# Patient Record
Sex: MALE | Race: BLACK OR AFRICAN AMERICAN | NOT HISPANIC OR LATINO | Employment: OTHER | URBAN - METROPOLITAN AREA
[De-identification: names, ages, dates, MRNs, and addresses within clinical notes are randomized per-mention and may not be internally consistent; named-entity substitution may affect disease eponyms.]

---

## 2020-03-13 ENCOUNTER — HISTORICAL (OUTPATIENT)
Dept: INTERNAL MEDICINE | Facility: CLINIC | Age: 55
End: 2020-03-13

## 2020-03-13 LAB
ABS NEUT (OLG): 12.21 X10(3)/MCL (ref 2.1–9.2)
ALBUMIN SERPL-MCNC: 4.6 GM/DL (ref 3.4–5)
ALBUMIN/GLOB SERPL: 1.3 RATIO (ref 1.1–2)
ALP SERPL-CCNC: 90 UNIT/L (ref 45–117)
ALT SERPL-CCNC: 71 UNIT/L (ref 12–78)
AST SERPL-CCNC: 31 UNIT/L (ref 15–37)
BASOPHILS # BLD AUTO: 0 X10(3)/MCL (ref 0–0.2)
BASOPHILS NFR BLD AUTO: 0 %
BILIRUB SERPL-MCNC: 0.5 MG/DL (ref 0.2–1)
BILIRUBIN DIRECT+TOT PNL SERPL-MCNC: 0.1 MG/DL (ref 0–0.2)
BILIRUBIN DIRECT+TOT PNL SERPL-MCNC: 0.4 MG/DL
BUN SERPL-MCNC: 16 MG/DL (ref 7–18)
CALCIUM SERPL-MCNC: 9.3 MG/DL (ref 8.5–10.1)
CHLORIDE SERPL-SCNC: 105 MMOL/L (ref 98–107)
CHOLEST SERPL-MCNC: 242 MG/DL
CHOLEST/HDLC SERPL: 3.8 {RATIO} (ref 0–5)
CO2 SERPL-SCNC: 28 MMOL/L (ref 21–32)
CREAT SERPL-MCNC: 1.2 MG/DL (ref 0.6–1.3)
EOSINOPHIL # BLD AUTO: 0 X10(3)/MCL (ref 0–0.9)
EOSINOPHIL NFR BLD AUTO: 0 %
ERYTHROCYTE [DISTWIDTH] IN BLOOD BY AUTOMATED COUNT: 12.9 % (ref 11.5–14.5)
EST. AVERAGE GLUCOSE BLD GHB EST-MCNC: 120 MG/DL
GLOBULIN SER-MCNC: 3.6 GM/ML (ref 2.3–3.5)
GLUCOSE SERPL-MCNC: 121 MG/DL (ref 74–106)
HBA1C MFR BLD: 5.8 % (ref 4.2–6.3)
HCT VFR BLD AUTO: 42 % (ref 40–51)
HDLC SERPL-MCNC: 63 MG/DL (ref 40–59)
HGB BLD-MCNC: 14.3 GM/DL (ref 13.5–17.5)
IMM GRANULOCYTES # BLD AUTO: 0.11 10*3/UL
IMM GRANULOCYTES NFR BLD AUTO: 1 %
LDLC SERPL CALC-MCNC: 162 MG/DL
LYMPHOCYTES # BLD AUTO: 2.4 X10(3)/MCL (ref 0.6–4.6)
LYMPHOCYTES NFR BLD AUTO: 16 %
MCH RBC QN AUTO: 29.4 PG (ref 26–34)
MCHC RBC AUTO-ENTMCNC: 34 GM/DL (ref 31–37)
MCV RBC AUTO: 86.4 FL (ref 80–100)
MONOCYTES # BLD AUTO: 0.7 X10(3)/MCL (ref 0.1–1.3)
MONOCYTES NFR BLD AUTO: 5 %
NEUTROPHILS # BLD AUTO: 12.21 X10(3)/MCL (ref 2.1–9.2)
NEUTROPHILS NFR BLD AUTO: 79 %
PLATELET # BLD AUTO: 268 X10(3)/MCL (ref 130–400)
PMV BLD AUTO: 10.2 FL (ref 7.4–10.4)
POTASSIUM SERPL-SCNC: 4.1 MMOL/L (ref 3.5–5.1)
PROT SERPL-MCNC: 8.2 GM/DL (ref 6.4–8.2)
RBC # BLD AUTO: 4.86 X10(6)/MCL (ref 4.5–5.9)
SODIUM SERPL-SCNC: 138 MMOL/L (ref 136–145)
TRIGL SERPL-MCNC: 83 MG/DL
TSH SERPL-ACNC: 0.56 MIU/L (ref 0.36–3.74)
VLDLC SERPL CALC-MCNC: 17 MG/DL
WBC # SPEC AUTO: 15.5 X10(3)/MCL (ref 4.5–11)

## 2020-10-20 ENCOUNTER — HISTORICAL (OUTPATIENT)
Dept: INTERNAL MEDICINE | Facility: CLINIC | Age: 55
End: 2020-10-20

## 2021-01-19 LAB — CRC RECOMMENDATION EXT: NORMAL

## 2021-12-16 ENCOUNTER — HISTORICAL (OUTPATIENT)
Dept: RADIOLOGY | Facility: HOSPITAL | Age: 56
End: 2021-12-16

## 2022-04-11 ENCOUNTER — HISTORICAL (OUTPATIENT)
Dept: ADMINISTRATIVE | Facility: HOSPITAL | Age: 57
End: 2022-04-11
Payer: MEDICAID

## 2022-04-24 VITALS
WEIGHT: 238.31 LBS | BODY MASS INDEX: 32.28 KG/M2 | DIASTOLIC BLOOD PRESSURE: 80 MMHG | SYSTOLIC BLOOD PRESSURE: 138 MMHG | HEIGHT: 72 IN

## 2022-05-09 ENCOUNTER — HOSPITAL ENCOUNTER (EMERGENCY)
Facility: HOSPITAL | Age: 57
Discharge: HOME | End: 2022-05-09
Attending: FAMILY MEDICINE
Payer: MEDICAID

## 2022-05-09 VITALS
BODY MASS INDEX: 26.51 KG/M2 | WEIGHT: 200 LBS | OXYGEN SATURATION: 99 % | DIASTOLIC BLOOD PRESSURE: 72 MMHG | SYSTOLIC BLOOD PRESSURE: 146 MMHG | RESPIRATION RATE: 18 BRPM | HEART RATE: 60 BPM | TEMPERATURE: 98 F | HEIGHT: 73 IN

## 2022-05-09 DIAGNOSIS — I10 PRIMARY HYPERTENSION: Primary | ICD-10-CM

## 2022-05-09 DIAGNOSIS — G44.52 NEW DAILY PERSISTENT HEADACHE: ICD-10-CM

## 2022-05-09 PROCEDURE — 99281 EMR DPT VST MAYX REQ PHY/QHP: CPT

## 2022-05-09 RX ORDER — AMLODIPINE BESYLATE 5 MG/1
5 TABLET ORAL DAILY
Qty: 30 TABLET | Refills: 0 | Status: SHIPPED | OUTPATIENT
Start: 2022-05-09 | End: 2022-05-12 | Stop reason: SDUPTHER

## 2022-05-09 RX ORDER — AMLODIPINE BESYLATE 5 MG/1
5 TABLET ORAL
COMMUNITY
Start: 2021-07-28 | End: 2022-05-09 | Stop reason: SDUPTHER

## 2022-05-09 NOTE — ED PROVIDER NOTES
Encounter Date: 5/9/2022       History     Chief Complaint   Patient presents with    Headache     States been out of his blood pressure meds for 6 days. And has a headache.      56-year-old female here for refill on blood pressure med patient says he is out of blood pressure medications.  Patient has appointment with his primary care doctor next week for follow-up.  Patient denies chest pain shortness of breath.  Patient states his headache has resolved.   Patient takes Norvasc 5 mg        Review of patient's allergies indicates:  No Known Allergies  Past Medical History:   Diagnosis Date    Hypertension      History reviewed. No pertinent surgical history.  History reviewed. No pertinent family history.  Social History     Tobacco Use    Smoking status: Current Every Day Smoker     Packs/day: 0.50     Types: Cigarettes    Smokeless tobacco: Never Used   Substance Use Topics    Alcohol use: Never    Drug use: Never     Review of Systems   Constitutional: Negative for fever.   HENT: Negative for sore throat.    Respiratory: Negative for shortness of breath.    Cardiovascular: Negative for chest pain.   Gastrointestinal: Negative for nausea.   Genitourinary: Negative for dysuria.   Musculoskeletal: Negative for back pain.   Skin: Negative for rash.   Neurological: Positive for headaches. Negative for weakness.   Hematological: Does not bruise/bleed easily.   All other systems reviewed and are negative.      Physical Exam     Initial Vitals [05/09/22 1032]   BP Pulse Resp Temp SpO2   (!) 146/72 60 18 98.1 °F (36.7 °C) 99 %      MAP       --         Physical Exam    Nursing note and vitals reviewed.  Constitutional: He appears well-developed and well-nourished. He is active.   HENT:   Head: Normocephalic and atraumatic.   Nose: Nose normal.   Mouth/Throat: Oropharyngeal exudate present.   Eyes: Conjunctivae, EOM and lids are normal. Pupils are equal, round, and reactive to light.   Neck: Trachea normal and  phonation normal. Neck supple. No thyroid mass present.   Normal range of motion.  Cardiovascular: Normal rate, regular rhythm, normal heart sounds and normal pulses.   Pulmonary/Chest: Breath sounds normal.   Abdominal: Abdomen is soft. Bowel sounds are normal.   Musculoskeletal:         General: Normal range of motion.      Cervical back: Normal range of motion and neck supple.     Neurological: He is alert and oriented to person, place, and time. He has normal strength and normal reflexes.   Skin: Skin is warm and intact.   Psychiatric: He has a normal mood and affect. His speech is normal and behavior is normal. Judgment and thought content normal. Cognition and memory are normal.         ED Course   Procedures  Labs Reviewed - No data to display       Imaging Results    None          Medications - No data to display                       Clinical Impression:   Final diagnoses:  [I10] Primary hypertension (Primary)  [G44.52] New daily persistent headache          ED Disposition Condition    Discharge Stable        ED Prescriptions     Medication Sig Dispense Start Date End Date Auth. Provider    amLODIPine (NORVASC) 5 MG tablet Take 1 tablet (5 mg total) by mouth once daily. 30 tablet 5/9/2022  Mansoor Ye MD        Follow-up Information     Follow up With Specialties Details Why Contact Info    [facility] - Emergency Dept Emergency Medicine  If symptoms worsen [address] [facility] [id number] [phone]           Mansoor Ye MD  05/09/22 8203

## 2022-05-12 ENCOUNTER — LAB VISIT (OUTPATIENT)
Dept: LAB | Facility: HOSPITAL | Age: 57
End: 2022-05-12
Attending: NURSE PRACTITIONER
Payer: MEDICAID

## 2022-05-12 ENCOUNTER — OFFICE VISIT (OUTPATIENT)
Dept: INTERNAL MEDICINE | Facility: CLINIC | Age: 57
End: 2022-05-12
Payer: MEDICAID

## 2022-05-12 VITALS
DIASTOLIC BLOOD PRESSURE: 77 MMHG | HEIGHT: 73 IN | TEMPERATURE: 99 F | BODY MASS INDEX: 30.8 KG/M2 | HEART RATE: 62 BPM | RESPIRATION RATE: 18 BRPM | WEIGHT: 232.38 LBS | SYSTOLIC BLOOD PRESSURE: 136 MMHG

## 2022-05-12 DIAGNOSIS — E78.2 MIXED HYPERLIPIDEMIA: Chronic | ICD-10-CM

## 2022-05-12 DIAGNOSIS — I10 PRIMARY HYPERTENSION: Chronic | ICD-10-CM

## 2022-05-12 DIAGNOSIS — Z72.0 TOBACCO USER: Chronic | ICD-10-CM

## 2022-05-12 DIAGNOSIS — E78.2 MIXED HYPERLIPIDEMIA: Primary | Chronic | ICD-10-CM

## 2022-05-12 DIAGNOSIS — E66.9 CLASS 1 OBESITY WITH SERIOUS COMORBIDITY AND BODY MASS INDEX (BMI) OF 30.0 TO 30.9 IN ADULT, UNSPECIFIED OBESITY TYPE: Chronic | ICD-10-CM

## 2022-05-12 DIAGNOSIS — Z13.9 SCREENING DUE: ICD-10-CM

## 2022-05-12 DIAGNOSIS — I10 PRIMARY HYPERTENSION: ICD-10-CM

## 2022-05-12 PROBLEM — G47.30 SLEEP APNEA: Chronic | Status: ACTIVE | Noted: 2022-05-12

## 2022-05-12 PROBLEM — G47.30 SLEEP APNEA: Status: ACTIVE | Noted: 2022-05-12

## 2022-05-12 LAB
ALBUMIN SERPL-MCNC: 4.5 GM/DL (ref 3.5–5)
ALBUMIN/GLOB SERPL: 1.6 RATIO (ref 1.1–2)
ALP SERPL-CCNC: 63 UNIT/L (ref 40–150)
ALT SERPL-CCNC: 20 UNIT/L (ref 0–55)
APPEARANCE UR: CLEAR
AST SERPL-CCNC: 18 UNIT/L (ref 5–34)
BACTERIA #/AREA URNS AUTO: ABNORMAL /HPF
BASOPHILS # BLD AUTO: 0.04 X10(3)/MCL (ref 0–0.2)
BASOPHILS NFR BLD AUTO: 0.5 %
BILIRUB UR QL STRIP.AUTO: NEGATIVE MG/DL
BILIRUBIN DIRECT+TOT PNL SERPL-MCNC: 0.5 MG/DL
BUN SERPL-MCNC: 10.9 MG/DL (ref 8.4–25.7)
CALCIUM SERPL-MCNC: 10.3 MG/DL (ref 8.4–10.2)
CHLORIDE SERPL-SCNC: 105 MMOL/L (ref 98–107)
CHOLEST SERPL-MCNC: 189 MG/DL
CHOLEST/HDLC SERPL: 5 {RATIO} (ref 0–5)
CO2 SERPL-SCNC: 27 MMOL/L (ref 22–29)
COLOR UR AUTO: ABNORMAL
CREAT SERPL-MCNC: 1.11 MG/DL (ref 0.73–1.18)
CREAT UR-MCNC: 228.3 MG/DL (ref 63–166)
EOSINOPHIL # BLD AUTO: 0.07 X10(3)/MCL (ref 0–0.9)
EOSINOPHIL NFR BLD AUTO: 0.9 %
ERYTHROCYTE [DISTWIDTH] IN BLOOD BY AUTOMATED COUNT: 12.7 % (ref 11.5–17)
EST. AVERAGE GLUCOSE BLD GHB EST-MCNC: 114 MG/DL
GLOBULIN SER-MCNC: 2.9 GM/DL (ref 2.4–3.5)
GLUCOSE SERPL-MCNC: 115 MG/DL (ref 74–100)
GLUCOSE UR QL STRIP.AUTO: NORMAL MG/DL
HBA1C MFR BLD: 5.6 %
HCT VFR BLD AUTO: 43.3 % (ref 42–52)
HCV AB SERPL QL IA: NONREACTIVE
HDLC SERPL-MCNC: 40 MG/DL (ref 35–60)
HGB BLD-MCNC: 15.3 GM/DL (ref 14–18)
HIV 1+2 AB+HIV1 P24 AG SERPL QL IA: NONREACTIVE
HYALINE CASTS #/AREA URNS LPF: ABNORMAL /LPF
IMM GRANULOCYTES # BLD AUTO: 0.03 X10(3)/MCL (ref 0–0.02)
IMM GRANULOCYTES NFR BLD AUTO: 0.4 % (ref 0–0.43)
KETONES UR QL STRIP.AUTO: NEGATIVE MG/DL
LDLC SERPL CALC-MCNC: 113 MG/DL (ref 50–140)
LEUKOCYTE ESTERASE UR QL STRIP.AUTO: NEGATIVE UNIT/L
LYMPHOCYTES # BLD AUTO: 3.03 X10(3)/MCL (ref 0.6–4.6)
LYMPHOCYTES NFR BLD AUTO: 40.8 %
MCH RBC QN AUTO: 30 PG (ref 27–31)
MCHC RBC AUTO-ENTMCNC: 35.3 MG/DL (ref 33–36)
MCV RBC AUTO: 84.9 FL (ref 80–94)
MICROALBUMIN UR-MCNC: 178.8 UG/ML
MICROALBUMIN/CREAT RATIO PNL UR: 78.3 MG/GM CR (ref 0–30)
MONOCYTES # BLD AUTO: 0.49 X10(3)/MCL (ref 0.1–1.3)
MONOCYTES NFR BLD AUTO: 6.6 %
MUCOUS THREADS URNS QL MICRO: ABNORMAL /LPF
NEUTROPHILS # BLD AUTO: 3.8 X10(3)/MCL (ref 2.1–9.2)
NEUTROPHILS NFR BLD AUTO: 50.8 %
NITRITE UR QL STRIP.AUTO: NEGATIVE
NRBC BLD AUTO-RTO: 0 %
PH UR STRIP.AUTO: 6.5 [PH]
PLATELET # BLD AUTO: 263 X10(3)/MCL (ref 130–400)
PMV BLD AUTO: 10.1 FL (ref 9.4–12.4)
POTASSIUM SERPL-SCNC: 4 MMOL/L (ref 3.5–5.1)
PROT SERPL-MCNC: 7.4 GM/DL (ref 6.4–8.3)
PROT UR QL STRIP.AUTO: ABNORMAL MG/DL
PSA SERPL-MCNC: 1.5 NG/ML
RBC # BLD AUTO: 5.1 X10(6)/MCL (ref 4.7–6.1)
RBC #/AREA URNS AUTO: ABNORMAL /HPF
RBC UR QL AUTO: ABNORMAL UNIT/L
SODIUM SERPL-SCNC: 140 MMOL/L (ref 136–145)
SP GR UR STRIP.AUTO: 1.02
SQUAMOUS #/AREA URNS LPF: ABNORMAL /HPF
TRIGL SERPL-MCNC: 180 MG/DL (ref 34–140)
TSH SERPL-ACNC: 1.11 UIU/ML (ref 0.35–4.94)
UROBILINOGEN UR STRIP-ACNC: NORMAL MG/DL
VLDLC SERPL CALC-MCNC: 36 MG/DL
WBC # SPEC AUTO: 7.4 X10(3)/MCL (ref 4.5–11.5)
WBC #/AREA URNS AUTO: ABNORMAL /HPF

## 2022-05-12 PROCEDURE — 81001 URINALYSIS AUTO W/SCOPE: CPT

## 2022-05-12 PROCEDURE — 82043 UR ALBUMIN QUANTITATIVE: CPT

## 2022-05-12 PROCEDURE — 87389 HIV-1 AG W/HIV-1&-2 AB AG IA: CPT

## 2022-05-12 PROCEDURE — 99214 PR OFFICE/OUTPT VISIT, EST, LEVL IV, 30-39 MIN: ICD-10-PCS | Mod: S$PBB,,, | Performed by: NURSE PRACTITIONER

## 2022-05-12 PROCEDURE — 99214 OFFICE O/P EST MOD 30 MIN: CPT | Mod: S$PBB,,, | Performed by: NURSE PRACTITIONER

## 2022-05-12 PROCEDURE — 3008F BODY MASS INDEX DOCD: CPT | Mod: CPTII,,, | Performed by: NURSE PRACTITIONER

## 2022-05-12 PROCEDURE — 85025 COMPLETE CBC W/AUTO DIFF WBC: CPT

## 2022-05-12 PROCEDURE — 86803 HEPATITIS C AB TEST: CPT

## 2022-05-12 PROCEDURE — 80053 COMPREHEN METABOLIC PANEL: CPT

## 2022-05-12 PROCEDURE — 3078F DIAST BP <80 MM HG: CPT | Mod: CPTII,,, | Performed by: NURSE PRACTITIONER

## 2022-05-12 PROCEDURE — 1159F MED LIST DOCD IN RCRD: CPT | Mod: CPTII,,, | Performed by: NURSE PRACTITIONER

## 2022-05-12 PROCEDURE — 84153 ASSAY OF PSA TOTAL: CPT

## 2022-05-12 PROCEDURE — 3075F PR MOST RECENT SYSTOLIC BLOOD PRESS GE 130-139MM HG: ICD-10-PCS | Mod: CPTII,,, | Performed by: NURSE PRACTITIONER

## 2022-05-12 PROCEDURE — 83036 HEMOGLOBIN GLYCOSYLATED A1C: CPT

## 2022-05-12 PROCEDURE — 1160F PR REVIEW ALL MEDS BY PRESCRIBER/CLIN PHARMACIST DOCUMENTED: ICD-10-PCS | Mod: CPTII,,, | Performed by: NURSE PRACTITIONER

## 2022-05-12 PROCEDURE — 3008F PR BODY MASS INDEX (BMI) DOCUMENTED: ICD-10-PCS | Mod: CPTII,,, | Performed by: NURSE PRACTITIONER

## 2022-05-12 PROCEDURE — 3075F SYST BP GE 130 - 139MM HG: CPT | Mod: CPTII,,, | Performed by: NURSE PRACTITIONER

## 2022-05-12 PROCEDURE — 3078F PR MOST RECENT DIASTOLIC BLOOD PRESSURE < 80 MM HG: ICD-10-PCS | Mod: CPTII,,, | Performed by: NURSE PRACTITIONER

## 2022-05-12 PROCEDURE — 99214 OFFICE O/P EST MOD 30 MIN: CPT | Mod: PBBFAC | Performed by: NURSE PRACTITIONER

## 2022-05-12 PROCEDURE — 84443 ASSAY THYROID STIM HORMONE: CPT

## 2022-05-12 PROCEDURE — 84075 ASSAY ALKALINE PHOSPHATASE: CPT

## 2022-05-12 PROCEDURE — 36415 COLL VENOUS BLD VENIPUNCTURE: CPT

## 2022-05-12 PROCEDURE — 1159F PR MEDICATION LIST DOCUMENTED IN MEDICAL RECORD: ICD-10-PCS | Mod: CPTII,,, | Performed by: NURSE PRACTITIONER

## 2022-05-12 PROCEDURE — 1160F RVW MEDS BY RX/DR IN RCRD: CPT | Mod: CPTII,,, | Performed by: NURSE PRACTITIONER

## 2022-05-12 PROCEDURE — 80061 LIPID PANEL: CPT

## 2022-05-12 RX ORDER — AMLODIPINE BESYLATE 5 MG/1
5 TABLET ORAL DAILY
Qty: 90 TABLET | Refills: 2 | Status: SHIPPED | OUTPATIENT
Start: 2022-05-12 | End: 2022-11-02 | Stop reason: SDUPTHER

## 2022-05-12 RX ORDER — OMEPRAZOLE 20 MG/1
20 CAPSULE, DELAYED RELEASE ORAL DAILY
COMMUNITY
Start: 2021-05-21 | End: 2022-05-12 | Stop reason: SDUPTHER

## 2022-05-12 RX ORDER — OMEPRAZOLE 20 MG/1
20 CAPSULE, DELAYED RELEASE ORAL DAILY
Qty: 90 CAPSULE | Refills: 2 | Status: SHIPPED | OUTPATIENT
Start: 2022-05-12 | End: 2022-11-02 | Stop reason: SDUPTHER

## 2022-05-12 RX ORDER — DICLOFENAC SODIUM 75 MG/1
75 TABLET, DELAYED RELEASE ORAL DAILY
COMMUNITY
Start: 2021-12-16 | End: 2022-11-02

## 2022-05-12 RX ORDER — ALLOPURINOL 300 MG/1
300 TABLET ORAL DAILY
Qty: 90 TABLET | Refills: 2 | Status: SHIPPED | OUTPATIENT
Start: 2022-05-12 | End: 2022-11-02 | Stop reason: SDUPTHER

## 2022-05-12 RX ORDER — ALLOPURINOL 300 MG/1
300 TABLET ORAL DAILY
COMMUNITY
Start: 2022-03-30 | End: 2022-05-12 | Stop reason: SDUPTHER

## 2022-05-12 RX ORDER — CYCLOBENZAPRINE HCL 10 MG
10 TABLET ORAL NIGHTLY PRN
COMMUNITY
Start: 2022-01-25 | End: 2022-05-12 | Stop reason: SDUPTHER

## 2022-05-12 RX ORDER — INDOMETHACIN 50 MG/1
50 CAPSULE ORAL 3 TIMES DAILY PRN
Qty: 30 CAPSULE | Refills: 2 | Status: SHIPPED | OUTPATIENT
Start: 2022-05-12 | End: 2022-11-02 | Stop reason: SDUPTHER

## 2022-05-12 RX ORDER — LORATADINE 10 MG/1
10 TABLET ORAL DAILY PRN
Qty: 30 TABLET | Refills: 2 | Status: SHIPPED | OUTPATIENT
Start: 2022-05-12 | End: 2022-11-02 | Stop reason: SDUPTHER

## 2022-05-12 RX ORDER — HYDROCHLOROTHIAZIDE 25 MG/1
25 TABLET ORAL DAILY
COMMUNITY
Start: 2021-07-28 | End: 2022-05-12 | Stop reason: SDUPTHER

## 2022-05-12 RX ORDER — CYCLOBENZAPRINE HCL 10 MG
10 TABLET ORAL NIGHTLY PRN
Qty: 30 TABLET | Refills: 1 | Status: SHIPPED | OUTPATIENT
Start: 2022-05-12 | End: 2022-11-02 | Stop reason: SDUPTHER

## 2022-05-12 RX ORDER — HYDROCHLOROTHIAZIDE 25 MG/1
25 TABLET ORAL DAILY
Qty: 90 TABLET | Refills: 2 | Status: SHIPPED | OUTPATIENT
Start: 2022-05-12 | End: 2022-11-02 | Stop reason: SDUPTHER

## 2022-05-12 NOTE — PROGRESS NOTES
Subjective:       Patient ID: Wilfrido Henriquez is a 56 y.o. male.    Chief Complaint: Annual Exam (Out of blood pressure  medication)    Patient has diagnosis of HTN, HLD, Gout, Sleep Apnea, Tobacco use.  Patient seen in clinic today for medication refills.  Patient last seen in clinic on May 21, 2021 by Dr. Bhatt. Patient denies any acute complaints at this time.     Patient seen in ED on 03/03/2022 for right eye pain. Diagnosed with conjunctivitis. Patient prescribed Maxitrol ophthalmic suspension and discharged home.    Patient seen in ED on 12/16/2021 for neck pain. Cervical X-ray indicated degenerative disc disease and spondylosis. Lumbar X-ray indicated. Minimal hypertrophic facet changes at the lower lumbar spine. Minimal degenerative changes at the SI joints.Patient prescribed Diclofenac and Robaxin and discharged home.     Patient seen in ED on 08/19/2021 for left leg pain. VSS & patient in NAD. Afebrile and non-toxic appearing. Resting comfortably in exam room with no acute complaints. Labs unremarkable, will discharge w/ short course of muscle relaxers. Diagnosis & treatment plan discussed with patient & patient voiced understanding. Informed patient that not ever having a job & a diagnosis of controlled HTN are likely not criteria for disability, but that he could contact Dr. Bhatt if he needs further evaluation for this. ED precautions given. All questions have been answered. Stable for discharge.       FIT: 11/29/2020, positive  Colonoscopy: 2021  Lung Cancer Screening: deferred   PSA: ordered    Review of Systems   Constitutional: Negative.    HENT: Negative.    Eyes: Negative.    Respiratory: Negative.    Cardiovascular: Negative.    Gastrointestinal: Negative.    Endocrine: Negative.    Genitourinary: Negative.    Musculoskeletal: Negative.    Integumentary:  Negative.   Allergic/Immunologic: Negative.    Neurological: Negative.    Hematological: Negative.    Psychiatric/Behavioral: Negative.           Objective:      Physical Exam  Vitals reviewed.   Constitutional:       Appearance: Normal appearance.   HENT:      Head: Normocephalic and atraumatic.      Mouth/Throat:      Mouth: Mucous membranes are moist.      Pharynx: Oropharynx is clear.   Eyes:      Extraocular Movements: Extraocular movements intact.      Conjunctiva/sclera: Conjunctivae normal.      Pupils: Pupils are equal, round, and reactive to light.   Cardiovascular:      Rate and Rhythm: Normal rate and regular rhythm.      Heart sounds: Normal heart sounds.   Pulmonary:      Effort: Pulmonary effort is normal.      Breath sounds: Normal breath sounds.   Abdominal:      General: Bowel sounds are normal.      Palpations: Abdomen is soft.   Musculoskeletal:         General: Normal range of motion.      Cervical back: Normal range of motion.   Skin:     General: Skin is warm and dry.   Neurological:      General: No focal deficit present.      Mental Status: He is alert and oriented to person, place, and time.   Psychiatric:         Mood and Affect: Mood normal.         Behavior: Behavior normal.         Assessment:       Problem List Items Addressed This Visit        Cardiac/Vascular    Primary hypertension (Chronic)     B/P: 136/77  UA Creatinine: ordered  UA Microalbumin: ordered  EK2021  Continue Amlodipine 5 mg daily, HCTZ 25 mg daily  DASH diet  Encouraged home blood pressure monitoring           Relevant Orders    CBC Auto Differential    Comprehensive Metabolic Panel    Microalbumin/Creatinine Ratio, Urine    TSH    Urinalysis, Reflex to Urine Culture Urine, Clean Catch    Mixed hyperlipidemia - Primary (Chronic)     No medications noted  Weight loss encouraged  Low fat/high fiber diet  Increase physical activity  Tobacco cessation encouraged  Labs done , repeat labs ordered today  Chol: 242  HDL: 63  Tri  LDL: 162           Relevant Orders    Lipid Panel       Endocrine    Obesity (Chronic)     BMI 30.66  Weight Loss  Encouraged  Increase physical activity           Relevant Orders    Hemoglobin A1C       Other    Tobacco user (Chronic)     Smoking cessation education provided, encouraged. Patient refused smoking cessation at this time.            Relevant Orders    Ambulatory referral/consult to Smoking Cessation Program      Other Visit Diagnoses     Screening due        Relevant Orders    PSA, Screening    Hepatitis C antibody    HIV 1/2 Ag/Ab (4th Gen)          Plan:    Follow up in 4 months with labs to be done prior to visit.

## 2022-05-12 NOTE — ASSESSMENT & PLAN NOTE
No medications noted  Weight loss encouraged  Low fat/high fiber diet  Increase physical activity  Tobacco cessation encouraged  Labs done , repeat labs ordered today  Chol: 242  HDL: 63  Tri  LDL: 162

## 2022-05-13 DIAGNOSIS — I10 HYPERTENSION, UNSPECIFIED TYPE: ICD-10-CM

## 2022-05-13 DIAGNOSIS — R31.0 GROSS HEMATURIA: Primary | ICD-10-CM

## 2022-05-13 DIAGNOSIS — E78.2 MIXED HYPERLIPIDEMIA: ICD-10-CM

## 2022-05-16 ENCOUNTER — TELEPHONE (OUTPATIENT)
Dept: INTERNAL MEDICINE | Facility: CLINIC | Age: 57
End: 2022-05-16
Payer: MEDICAID

## 2022-05-16 NOTE — TELEPHONE ENCOUNTER
PC-lab result given to pt. As reviewed per Denis NP. Informed CT was ordered-will be contacted by  to set up CT + will have fasting labs ordered prior to NV. Verbalized understanding to info.given.

## 2022-06-22 ENCOUNTER — TELEPHONE (OUTPATIENT)
Dept: SMOKING CESSATION | Facility: CLINIC | Age: 57
End: 2022-06-22
Payer: MEDICAID

## 2022-06-22 NOTE — TELEPHONE ENCOUNTER
Attempted to call patient regarding missed [facility] appointment. No answer.  Unable to leave a message.  Voice mailbox not setup.

## 2022-08-23 ENCOUNTER — TELEPHONE (OUTPATIENT)
Dept: ENDOSCOPY | Facility: HOSPITAL | Age: 57
End: 2022-08-23
Payer: MEDICAID

## 2022-10-10 ENCOUNTER — TELEPHONE (OUTPATIENT)
Dept: INTERNAL MEDICINE | Facility: CLINIC | Age: 57
End: 2022-10-10
Payer: MEDICAID

## 2022-10-23 ENCOUNTER — HOSPITAL ENCOUNTER (EMERGENCY)
Facility: HOSPITAL | Age: 57
Discharge: HOME | End: 2022-10-23
Attending: STUDENT IN AN ORGANIZED HEALTH CARE EDUCATION/TRAINING PROGRAM
Payer: MEDICAID

## 2022-10-23 VITALS
DIASTOLIC BLOOD PRESSURE: 82 MMHG | OXYGEN SATURATION: 99 % | HEIGHT: 73 IN | TEMPERATURE: 99 F | BODY MASS INDEX: 31.91 KG/M2 | WEIGHT: 240.75 LBS | RESPIRATION RATE: 14 BRPM | HEART RATE: 68 BPM | SYSTOLIC BLOOD PRESSURE: 156 MMHG

## 2022-10-23 DIAGNOSIS — R07.89 CHEST PAIN, ATYPICAL: Primary | ICD-10-CM

## 2022-10-23 DIAGNOSIS — R07.9 CHEST PAIN: ICD-10-CM

## 2022-10-23 LAB
ALBUMIN SERPL-MCNC: 4.3 GM/DL (ref 3.5–5)
ALBUMIN/GLOB SERPL: 1.7 RATIO (ref 1.1–2)
ALP SERPL-CCNC: 58 UNIT/L (ref 40–150)
ALT SERPL-CCNC: 21 UNIT/L (ref 0–55)
AST SERPL-CCNC: 19 UNIT/L (ref 5–34)
BASOPHILS # BLD AUTO: 0.07 X10(3)/MCL (ref 0–0.2)
BASOPHILS NFR BLD AUTO: 1.1 %
BILIRUBIN DIRECT+TOT PNL SERPL-MCNC: 0.7 MG/DL
BUN SERPL-MCNC: 12.6 MG/DL (ref 8.4–25.7)
CALCIUM SERPL-MCNC: 9.7 MG/DL (ref 8.4–10.2)
CHLORIDE SERPL-SCNC: 102 MMOL/L (ref 98–107)
CO2 SERPL-SCNC: 26 MMOL/L (ref 22–29)
CREAT SERPL-MCNC: 0.99 MG/DL (ref 0.73–1.18)
EOSINOPHIL # BLD AUTO: 0.09 X10(3)/MCL (ref 0–0.9)
EOSINOPHIL NFR BLD AUTO: 1.4 %
ERYTHROCYTE [DISTWIDTH] IN BLOOD BY AUTOMATED COUNT: 12.6 % (ref 11.5–17)
GFR SERPLBLD CREATININE-BSD FMLA CKD-EPI: >60 MLS/MIN/1.73/M2
GLOBULIN SER-MCNC: 2.6 GM/DL (ref 2.4–3.5)
GLUCOSE SERPL-MCNC: 140 MG/DL (ref 74–100)
HCT VFR BLD AUTO: 42.2 % (ref 42–52)
HGB BLD-MCNC: 14.6 GM/DL (ref 14–18)
IMM GRANULOCYTES # BLD AUTO: 0.02 X10(3)/MCL (ref 0–0.04)
IMM GRANULOCYTES NFR BLD AUTO: 0.3 %
LYMPHOCYTES # BLD AUTO: 2.72 X10(3)/MCL (ref 0.6–4.6)
LYMPHOCYTES NFR BLD AUTO: 41.7 %
MCH RBC QN AUTO: 29.6 PG (ref 27–31)
MCHC RBC AUTO-ENTMCNC: 34.6 MG/DL (ref 33–36)
MCV RBC AUTO: 85.6 FL (ref 80–94)
MONOCYTES # BLD AUTO: 0.52 X10(3)/MCL (ref 0.1–1.3)
MONOCYTES NFR BLD AUTO: 8 %
NEUTROPHILS # BLD AUTO: 3.1 X10(3)/MCL (ref 2.1–9.2)
NEUTROPHILS NFR BLD AUTO: 47.5 %
NRBC BLD AUTO-RTO: 0 %
PLATELET # BLD AUTO: 270 X10(3)/MCL (ref 130–400)
PMV BLD AUTO: 10.1 FL (ref 7.4–10.4)
POTASSIUM SERPL-SCNC: 3.3 MMOL/L (ref 3.5–5.1)
PROT SERPL-MCNC: 6.9 GM/DL (ref 6.4–8.3)
RBC # BLD AUTO: 4.93 X10(6)/MCL (ref 4.7–6.1)
SODIUM SERPL-SCNC: 139 MMOL/L (ref 136–145)
TROPONIN I SERPL-MCNC: <0.01 NG/ML (ref 0–0.04)
WBC # SPEC AUTO: 6.5 X10(3)/MCL (ref 4.5–11.5)

## 2022-10-23 PROCEDURE — 84484 ASSAY OF TROPONIN QUANT: CPT

## 2022-10-23 PROCEDURE — 25000003 PHARM REV CODE 250: Performed by: PHYSICIAN ASSISTANT

## 2022-10-23 PROCEDURE — 99285 EMERGENCY DEPT VISIT HI MDM: CPT | Mod: 25

## 2022-10-23 PROCEDURE — 80053 COMPREHEN METABOLIC PANEL: CPT | Performed by: PHYSICIAN ASSISTANT

## 2022-10-23 PROCEDURE — 36415 COLL VENOUS BLD VENIPUNCTURE: CPT | Performed by: PHYSICIAN ASSISTANT

## 2022-10-23 PROCEDURE — 93005 ELECTROCARDIOGRAM TRACING: CPT

## 2022-10-23 PROCEDURE — 85025 COMPLETE CBC W/AUTO DIFF WBC: CPT | Performed by: PHYSICIAN ASSISTANT

## 2022-10-23 PROCEDURE — 84484 ASSAY OF TROPONIN QUANT: CPT | Performed by: PHYSICIAN ASSISTANT

## 2022-10-23 RX ORDER — ASPIRIN 325 MG
325 TABLET ORAL
Status: COMPLETED | OUTPATIENT
Start: 2022-10-23 | End: 2022-10-23

## 2022-10-23 RX ORDER — POTASSIUM CHLORIDE 20 MEQ/1
20 TABLET, EXTENDED RELEASE ORAL
Status: DISCONTINUED | OUTPATIENT
Start: 2022-10-23 | End: 2022-10-23 | Stop reason: HOSPADM

## 2022-10-23 RX ORDER — MAG HYDROX/ALUMINUM HYD/SIMETH 200-200-20
5 SUSPENSION, ORAL (FINAL DOSE FORM) ORAL
Status: COMPLETED | OUTPATIENT
Start: 2022-10-23 | End: 2022-10-23

## 2022-10-23 RX ADMIN — ALUMINUM HYDROXIDE, MAGNESIUM HYDROXIDE, AND DIMETHICONE 5 ML: 200; 20; 200 SUSPENSION ORAL at 09:10

## 2022-10-23 RX ADMIN — ASPIRIN 325 MG ORAL TABLET 325 MG: 325 PILL ORAL at 09:10

## 2022-10-23 NOTE — ED PROVIDER NOTES
"Encounter Date: 10/23/2022       History     Chief Complaint   Patient presents with    Chest Pain     Substernal chest "burning" pain x 3 days     57-year-old gentleman history of hypertension hyperlipidemia presented emergency department with a 3 day history of intermittent mid substernal chest pain described as burning sensation the episodes lasting approximately an hour no exacerbating or alleviating factors no radiation of pain other pertinent review of systems negative.  A prior history of cardiac comorbidities.        Review of patient's allergies indicates:  No Known Allergies  Past Medical History:   Diagnosis Date    Hypertension      History reviewed. No pertinent surgical history.  Family History   Problem Relation Age of Onset    Hypertension Mother     Hypertension Father     Aneurysm Father      Social History     Tobacco Use    Smoking status: Every Day     Packs/day: 0.50     Years: 40.00     Pack years: 20.00     Types: Cigarettes    Smokeless tobacco: Never   Substance Use Topics    Alcohol use: Never    Drug use: Never     Review of Systems   Constitutional:  Negative for chills and fever.   HENT:  Negative for congestion and sinus pain.    Eyes:  Negative for redness and visual disturbance.   Respiratory:  Negative for cough and shortness of breath.    Cardiovascular:  Positive for chest pain. Negative for leg swelling.   Gastrointestinal:  Negative for abdominal pain, diarrhea and nausea.   Endocrine: Negative for polydipsia and polyuria.   Genitourinary:  Negative for dysuria and flank pain.   Musculoskeletal:  Negative for back pain and neck pain.   Skin:  Negative for rash and wound.   Neurological:  Negative for weakness and numbness.     Physical Exam     Initial Vitals [10/23/22 0855]   BP Pulse Resp Temp SpO2   (!) 159/87 66 16 98.8 °F (37.1 °C) 99 %      MAP       --         Physical Exam    Nursing note and vitals reviewed.  Constitutional: He appears well-developed and well-nourished. "   HENT:   Head: Normocephalic and atraumatic.   Eyes: EOM are normal. Pupils are equal, round, and reactive to light.   Neck: Neck supple.   Normal range of motion.  Cardiovascular:  Normal rate, regular rhythm and normal heart sounds.           No murmur heard.  Pulmonary/Chest: Breath sounds normal. No respiratory distress. He has no wheezes. He has no rhonchi. He has no rales.   Abdominal: Abdomen is soft. Bowel sounds are normal.   Musculoskeletal:      Cervical back: Normal range of motion and neck supple.     Neurological: He is alert and oriented to person, place, and time.   Skin: Skin is warm and dry. Capillary refill takes less than 2 seconds.   Psychiatric: He has a normal mood and affect. His behavior is normal.       ED Course   Procedures  Labs Reviewed   COMPREHENSIVE METABOLIC PANEL - Abnormal; Notable for the following components:       Result Value    Potassium Level 3.3 (*)     Glucose Level 140 (*)     All other components within normal limits   TROPONIN I - Normal   CBC W/ AUTO DIFFERENTIAL    Narrative:     The following orders were created for panel order CBC auto differential.  Procedure                               Abnormality         Status                     ---------                               -----------         ------                     CBC with Differential[[id number]]                            Final result                 Please view results for these tests on the individual orders.   CBC WITH DIFFERENTIAL     EKG Readings: (Independently Interpreted)   EKG a 59 normal sinus rhythm 84 beats per minute normal axis no intraventricular block no ectopy no ST deviation   ECG Results              EKG 12-lead (Chest Pain) Age >30 (In process)  Result time 10/23/22 11:17:18      In process by Interface, Lab In [facility] (10/23/22 11:17:18)                   Narrative:    Test Reason : R07.9,    Vent. Rate : 084 BPM     Atrial Rate : 084 BPM     P-R Int : 158 ms          QRS Dur : 108 ms       QT Int : 368 ms       P-R-T Axes : 079 064 036 degrees     QTc Int : 434 ms    Normal sinus rhythm  Normal ECG  No previous ECGs available    Referred By: [email or web address]   SELF           Confirmed By:                                   Imaging Results              X-Ray Chest 1 View (Final result)  Result time 10/23/22 10:57:54      Final result by Juvencio Whyte MD (10/23/22 10:57:54)                   Impression:      No acute chest disease is identified.      Electronically signed by: Juvencio Whyte  Date:    10/23/2022  Time:    10:57               Narrative:    EXAMINATION:  XR CHEST 1 VIEW    CLINICAL HISTORY:  , Chest pain, unspecified.    COMPARISON:  May 5, 2021    FINDINGS:  No alveolar consolidation, effusion, or pneumothorax is seen.   The thoracic aorta is normal  cardiac silhouette, central pulmonary vessels and mediastinum are normal in size and are grossly unremarkable.   visualized osseous structures are grossly unremarkable.                        ED Interpretation by STEVE Rebolledo (10/23/22 10:20:20, [facility], Emergency Medicine)    Negative                                     Medications   aspirin tablet 325 mg (325 mg Oral Given 10/23/22 0922)   aluminum-magnesium hydroxide-simethicone 200-200-20 mg/5 mL suspension 5 mL (5 mLs Oral Given 10/23/22 0922)           APC / Resident Notes:   I was not physically present during the history, exam or disposition of this patient. I was available at all times for consultation. (Zmora)                   Clinical Impression:   Final diagnoses:  [R07.9] Chest pain  [R07.89] Chest pain, atypical (Primary)        ED Disposition Condition    Discharge Stable          ED Prescriptions    None       Follow-up Information       Follow up With Specialties Details Why Contact Info    [facility] Schedule an appointment as soon as possible for a visit in 1 week  [address]  [address]  [phone]      [facility] Emergency Medicine  If symptoms worsen, As needed [address] [facility] [id number] [phone]             STEVE Rebolledo  10/23/22 1029       Srinivas Shafer MD  10/24/22 6341

## 2022-11-01 PROBLEM — E66.09 CLASS 1 OBESITY DUE TO EXCESS CALORIES WITH SERIOUS COMORBIDITY AND BODY MASS INDEX (BMI) OF 31.0 TO 31.9 IN ADULT: Chronic | Status: ACTIVE | Noted: 2022-05-12

## 2022-11-01 PROBLEM — E66.811 CLASS 1 OBESITY DUE TO EXCESS CALORIES WITH SERIOUS COMORBIDITY AND BODY MASS INDEX (BMI) OF 31.0 TO 31.9 IN ADULT: Chronic | Status: ACTIVE | Noted: 2022-05-12

## 2022-11-01 NOTE — PROGRESS NOTES
Subjective:       Patient ID: Wilfrido Henriquez is a 57 y.o. male.    Chief Complaint: Follow-up (C/O having chest pain  for 2 weeks, state went to ED. C/O  having muscle cramps.), Headache, and Chest Pain    Patient has diagnosis of HTN, HLD, Gout, Sleep Apnea, Tobacco use.  Patient seen in clinic today for medication refills.  Patient last seen in clinic on 05/12/2022. 3+ blood noted in urine on labs done in May. Patient denies dysuria abdominal pain. CT abdomen/pelvis ordered but not completed. Patient states his phone was turned off because he couldn't afford to pay it. Patient also states watery eyes at times. Patient denies change in vision, eye pain. Patient denies any other acute complaints at this time.      Patient seen in ED on 12/16/2021 for neck pain. Cervical X-ray indicated degenerative disc disease and spondylosis. Lumbar X-ray indicated. Minimal hypertrophic facet changes at the lower lumbar spine. Minimal degenerative changes at the SI joints.Patient prescribed Diclofenac and Robaxin and discharged home.         FIT: 11/29/2020, positive  Colonoscopy: 01/19/2021 with Dr. Lombardi, no report noted, will try to obtain  Lung Cancer Screening: ordered  PSA: 1.5  Medicare Wellness: N/A    Review of Systems   Constitutional: Negative.    HENT: Negative.     Eyes: Negative.    Respiratory: Negative.     Cardiovascular: Negative.    Gastrointestinal: Negative.    Endocrine: Negative.    Genitourinary: Negative.    Musculoskeletal: Negative.    Integumentary:  Negative.   Allergic/Immunologic: Negative.    Neurological: Negative.    Hematological: Negative.    Psychiatric/Behavioral: Negative.         Objective:      Physical Exam  Vitals reviewed.   Constitutional:       Appearance: Normal appearance.   HENT:      Head: Normocephalic and atraumatic.      Mouth/Throat:      Mouth: Mucous membranes are moist.      Pharynx: Oropharynx is clear.   Eyes:      Extraocular Movements: Extraocular movements intact.       Conjunctiva/sclera: Conjunctivae normal.      Pupils: Pupils are equal, round, and reactive to light.   Cardiovascular:      Rate and Rhythm: Normal rate and regular rhythm.      Heart sounds: Normal heart sounds.   Pulmonary:      Effort: Pulmonary effort is normal.      Breath sounds: Normal breath sounds.   Abdominal:      General: Bowel sounds are normal.   Musculoskeletal:         General: Normal range of motion.      Cervical back: Normal range of motion.   Skin:     General: Skin is warm and dry.   Neurological:      Mental Status: He is alert and oriented to person, place, and time.   Psychiatric:         Mood and Affect: Mood normal.         Behavior: Behavior normal.       Assessment:       Problem List Items Addressed This Visit          Cardiac/Vascular    Primary hypertension - Primary (Chronic)     B/P: 136/77  UA Creatinine: ordered  UA Microalbumin: ordered  EK2021  Continue Amlodipine 5 mg daily, HCTZ 25 mg daily  DASH diet  Encouraged home blood pressure monitoring         Mixed hyperlipidemia (Chronic)     No medications noted  Weight loss encouraged  Low fat/high fiber diet  Increase physical activity  Tobacco cessation encouraged   Latest Reference Range & Units 22 09:47   Cholesterol <=200 mg/dL 189   HDL 35 - 60 mg/dL 40   LDL Cholesterol External 50.00 - 140.00 mg/dL 113.00   Total Cholesterol/HDL Ratio 0 - 5  5   Triglycerides 34 - 140 mg/dL 180 (H)   Very Low Density Lipoprotein  36   (H): Data is abnormally high            Renal/    Asymptomatic microscopic hematuria     CT abdomen/pelvis not done  Repeat UA ordered         Relevant Orders    Urinalysis, Reflex to Urine Culture Urine, Clean Catch       Endocrine    Class 1 obesity due to excess calories with serious comorbidity and body mass index (BMI) of 32.0 to 32.9 in adult (Chronic)     BMI: 32.09  TSH: 1.10  Vitamin D level: ordered  HgbA1c: 5.6  Weight Loss Encouraged  Increase Physical Activity            Other     Cigarette nicotine dependence without complication (Chronic)     Smoking cessation education provided, encouraged. Patient refused smoking cessation at this time.  CT Lung Cancer screening ordered         Relevant Orders    CT Chest Lung Screening Low Dose    RESOLVED: Tobacco user (Chronic)       Plan:    Patient to follow up in 4 months with labs to be done prior to appointment to reassess hematuria, HTN.

## 2022-11-02 ENCOUNTER — OFFICE VISIT (OUTPATIENT)
Dept: INTERNAL MEDICINE | Facility: CLINIC | Age: 57
End: 2022-11-02
Payer: MEDICAID

## 2022-11-02 VITALS
HEART RATE: 59 BPM | DIASTOLIC BLOOD PRESSURE: 80 MMHG | WEIGHT: 243.19 LBS | BODY MASS INDEX: 32.23 KG/M2 | RESPIRATION RATE: 18 BRPM | SYSTOLIC BLOOD PRESSURE: 131 MMHG | HEIGHT: 73 IN | TEMPERATURE: 98 F

## 2022-11-02 DIAGNOSIS — E78.2 MIXED HYPERLIPIDEMIA: Chronic | ICD-10-CM

## 2022-11-02 DIAGNOSIS — R31.21 ASYMPTOMATIC MICROSCOPIC HEMATURIA: ICD-10-CM

## 2022-11-02 DIAGNOSIS — F17.210 CIGARETTE NICOTINE DEPENDENCE WITHOUT COMPLICATION: ICD-10-CM

## 2022-11-02 DIAGNOSIS — E66.09 CLASS 1 OBESITY DUE TO EXCESS CALORIES WITH SERIOUS COMORBIDITY AND BODY MASS INDEX (BMI) OF 32.0 TO 32.9 IN ADULT: Chronic | ICD-10-CM

## 2022-11-02 DIAGNOSIS — I10 PRIMARY HYPERTENSION: Primary | Chronic | ICD-10-CM

## 2022-11-02 DIAGNOSIS — Z72.0 TOBACCO USER: Chronic | ICD-10-CM

## 2022-11-02 LAB
APPEARANCE UR: CLEAR
BACTERIA #/AREA URNS AUTO: ABNORMAL /HPF
BILIRUB UR QL STRIP.AUTO: NEGATIVE MG/DL
COLOR UR AUTO: COLORLESS
GLUCOSE UR QL STRIP.AUTO: NORMAL MG/DL
HYALINE CASTS #/AREA URNS LPF: ABNORMAL /LPF
KETONES UR QL STRIP.AUTO: NEGATIVE MG/DL
LEUKOCYTE ESTERASE UR QL STRIP.AUTO: NEGATIVE UNIT/L
MUCOUS THREADS URNS QL MICRO: ABNORMAL /LPF
NITRITE UR QL STRIP.AUTO: NEGATIVE
PH UR STRIP.AUTO: 5.5 [PH]
PROT UR QL STRIP.AUTO: NEGATIVE MG/DL
RBC #/AREA URNS AUTO: ABNORMAL /HPF
RBC UR QL AUTO: ABNORMAL UNIT/L
SP GR UR STRIP.AUTO: 1.01
SQUAMOUS #/AREA URNS LPF: ABNORMAL /HPF
UROBILINOGEN UR STRIP-ACNC: NORMAL MG/DL
WBC #/AREA URNS AUTO: ABNORMAL /HPF

## 2022-11-02 PROCEDURE — 3008F BODY MASS INDEX DOCD: CPT | Mod: CPTII,,, | Performed by: NURSE PRACTITIONER

## 2022-11-02 PROCEDURE — 3060F PR POS MICROALBUMINURIA RESULT DOCUMENTED/REVIEW: ICD-10-PCS | Mod: CPTII,,, | Performed by: NURSE PRACTITIONER

## 2022-11-02 PROCEDURE — 3066F PR DOCUMENTATION OF TREATMENT FOR NEPHROPATHY: ICD-10-PCS | Mod: CPTII,,, | Performed by: NURSE PRACTITIONER

## 2022-11-02 PROCEDURE — 3075F PR MOST RECENT SYSTOLIC BLOOD PRESS GE 130-139MM HG: ICD-10-PCS | Mod: CPTII,,, | Performed by: NURSE PRACTITIONER

## 2022-11-02 PROCEDURE — 81001 URINALYSIS AUTO W/SCOPE: CPT | Performed by: NURSE PRACTITIONER

## 2022-11-02 PROCEDURE — 3008F PR BODY MASS INDEX (BMI) DOCUMENTED: ICD-10-PCS | Mod: CPTII,,, | Performed by: NURSE PRACTITIONER

## 2022-11-02 PROCEDURE — 3079F DIAST BP 80-89 MM HG: CPT | Mod: CPTII,,, | Performed by: NURSE PRACTITIONER

## 2022-11-02 PROCEDURE — 1160F RVW MEDS BY RX/DR IN RCRD: CPT | Mod: CPTII,,, | Performed by: NURSE PRACTITIONER

## 2022-11-02 PROCEDURE — 1159F PR MEDICATION LIST DOCUMENTED IN MEDICAL RECORD: ICD-10-PCS | Mod: CPTII,,, | Performed by: NURSE PRACTITIONER

## 2022-11-02 PROCEDURE — 3060F POS MICROALBUMINURIA REV: CPT | Mod: CPTII,,, | Performed by: NURSE PRACTITIONER

## 2022-11-02 PROCEDURE — 1160F PR REVIEW ALL MEDS BY PRESCRIBER/CLIN PHARMACIST DOCUMENTED: ICD-10-PCS | Mod: CPTII,,, | Performed by: NURSE PRACTITIONER

## 2022-11-02 PROCEDURE — 3079F PR MOST RECENT DIASTOLIC BLOOD PRESSURE 80-89 MM HG: ICD-10-PCS | Mod: CPTII,,, | Performed by: NURSE PRACTITIONER

## 2022-11-02 PROCEDURE — 99214 OFFICE O/P EST MOD 30 MIN: CPT | Mod: S$PBB,,, | Performed by: NURSE PRACTITIONER

## 2022-11-02 PROCEDURE — 99214 OFFICE O/P EST MOD 30 MIN: CPT | Mod: PBBFAC | Performed by: NURSE PRACTITIONER

## 2022-11-02 PROCEDURE — 3075F SYST BP GE 130 - 139MM HG: CPT | Mod: CPTII,,, | Performed by: NURSE PRACTITIONER

## 2022-11-02 PROCEDURE — 3066F NEPHROPATHY DOC TX: CPT | Mod: CPTII,,, | Performed by: NURSE PRACTITIONER

## 2022-11-02 PROCEDURE — 99214 PR OFFICE/OUTPT VISIT, EST, LEVL IV, 30-39 MIN: ICD-10-PCS | Mod: S$PBB,,, | Performed by: NURSE PRACTITIONER

## 2022-11-02 PROCEDURE — 1159F MED LIST DOCD IN RCRD: CPT | Mod: CPTII,,, | Performed by: NURSE PRACTITIONER

## 2022-11-02 RX ORDER — INDOMETHACIN 50 MG/1
50 CAPSULE ORAL 3 TIMES DAILY PRN
Qty: 30 CAPSULE | Refills: 2 | Status: SHIPPED | OUTPATIENT
Start: 2022-11-02 | End: 2022-11-27 | Stop reason: SDUPTHER

## 2022-11-02 RX ORDER — AMLODIPINE BESYLATE 5 MG/1
5 TABLET ORAL DAILY
Qty: 90 TABLET | Refills: 1 | Status: SHIPPED | OUTPATIENT
Start: 2022-11-02 | End: 2023-03-15 | Stop reason: SDUPTHER

## 2022-11-02 RX ORDER — CYCLOBENZAPRINE HCL 10 MG
10 TABLET ORAL NIGHTLY PRN
Qty: 30 TABLET | Refills: 1 | Status: SHIPPED | OUTPATIENT
Start: 2022-11-02 | End: 2022-11-27 | Stop reason: SDUPTHER

## 2022-11-02 RX ORDER — ALLOPURINOL 300 MG/1
300 TABLET ORAL DAILY
Qty: 90 TABLET | Refills: 1 | Status: SHIPPED | OUTPATIENT
Start: 2022-11-02 | End: 2023-03-15 | Stop reason: SDUPTHER

## 2022-11-02 RX ORDER — LORATADINE 10 MG/1
10 TABLET ORAL DAILY PRN
Qty: 30 TABLET | Refills: 2 | Status: SHIPPED | OUTPATIENT
Start: 2022-11-02 | End: 2023-03-15

## 2022-11-02 RX ORDER — OMEPRAZOLE 20 MG/1
20 CAPSULE, DELAYED RELEASE ORAL DAILY
Qty: 90 CAPSULE | Refills: 1 | Status: SHIPPED | OUTPATIENT
Start: 2022-11-02 | End: 2023-08-09

## 2022-11-02 RX ORDER — HYDROCHLOROTHIAZIDE 25 MG/1
25 TABLET ORAL DAILY
Qty: 90 TABLET | Refills: 1 | Status: SHIPPED | OUTPATIENT
Start: 2022-11-02 | End: 2023-03-15 | Stop reason: SDUPTHER

## 2022-11-02 NOTE — ASSESSMENT & PLAN NOTE
No medications noted  Weight loss encouraged  Low fat/high fiber diet  Increase physical activity  Tobacco cessation encouraged   Latest Reference Range & Units 05/12/22 09:47   Cholesterol <=200 mg/dL 189   HDL 35 - 60 mg/dL 40   LDL Cholesterol External 50.00 - 140.00 mg/dL 113.00   Total Cholesterol/HDL Ratio 0 - 5  5   Triglycerides 34 - 140 mg/dL 180 (H)   Very Low Density Lipoprotein  36   (H): Data is abnormally high

## 2022-11-02 NOTE — ASSESSMENT & PLAN NOTE
BMI: 32.09  TSH: 1.10  Vitamin D level: ordered  HgbA1c: 5.6  Weight Loss Encouraged  Increase Physical Activity

## 2022-11-02 NOTE — ASSESSMENT & PLAN NOTE
B/P: 136/77  UA Creatinine: ordered  UA Microalbumin: ordered  EK2021  Continue Amlodipine 5 mg daily, HCTZ 25 mg daily  DASH diet  Encouraged home blood pressure monitoring

## 2022-11-02 NOTE — ASSESSMENT & PLAN NOTE
Smoking cessation education provided, encouraged. Patient refused smoking cessation at this time.  CT Lung Cancer screening ordered

## 2022-11-08 ENCOUNTER — HOSPITAL ENCOUNTER (OUTPATIENT)
Dept: RADIOLOGY | Facility: HOSPITAL | Age: 57
Discharge: HOME | End: 2022-11-08
Attending: NURSE PRACTITIONER
Payer: MEDICAID

## 2022-11-08 DIAGNOSIS — F17.210 CIGARETTE NICOTINE DEPENDENCE WITHOUT COMPLICATION: ICD-10-CM

## 2022-11-08 PROCEDURE — 71271 CT THORAX LUNG CANCER SCR C-: CPT | Mod: TC

## 2022-11-27 ENCOUNTER — HOSPITAL ENCOUNTER (EMERGENCY)
Facility: HOSPITAL | Age: 57
Discharge: HOME | End: 2022-11-27
Attending: SPECIALIST
Payer: MEDICAID

## 2022-11-27 VITALS
DIASTOLIC BLOOD PRESSURE: 65 MMHG | HEART RATE: 75 BPM | RESPIRATION RATE: 18 BRPM | OXYGEN SATURATION: 98 % | HEIGHT: 73 IN | WEIGHT: 230 LBS | SYSTOLIC BLOOD PRESSURE: 165 MMHG | TEMPERATURE: 98 F | BODY MASS INDEX: 30.48 KG/M2

## 2022-11-27 DIAGNOSIS — M54.2 NECK PAIN: Primary | ICD-10-CM

## 2022-11-27 DIAGNOSIS — R03.0 ELEVATED BLOOD PRESSURE READING: ICD-10-CM

## 2022-11-27 DIAGNOSIS — R52 PAIN: ICD-10-CM

## 2022-11-27 PROCEDURE — 99284 EMERGENCY DEPT VISIT MOD MDM: CPT

## 2022-11-27 RX ORDER — CYCLOBENZAPRINE HCL 10 MG
10 TABLET ORAL NIGHTLY PRN
Qty: 30 TABLET | Refills: 0 | OUTPATIENT
Start: 2022-11-27 | End: 2022-12-04

## 2022-11-27 RX ORDER — INDOMETHACIN 50 MG/1
50 CAPSULE ORAL 3 TIMES DAILY PRN
Qty: 30 CAPSULE | Refills: 0 | OUTPATIENT
Start: 2022-11-27 | End: 2022-12-04

## 2022-11-27 RX ORDER — KETOROLAC TROMETHAMINE 30 MG/ML
60 INJECTION, SOLUTION INTRAMUSCULAR; INTRAVENOUS
Status: DISCONTINUED | OUTPATIENT
Start: 2022-11-27 | End: 2022-11-27 | Stop reason: HOSPADM

## 2022-11-28 NOTE — ED PROVIDER NOTES
Encounter Date: 11/27/2022       History     Chief Complaint   Patient presents with    Neck Pain     Neck pain since Thursday denies fall or injury      Patient complains of left-sided neck pain over the last 4 days but also notes he has chronic neck pain off and on for years; he reports a history of gout; no injury noted, no focal deficits; pain worsened with movement    The history is provided by the patient.   Review of patient's allergies indicates:  No Known Allergies  Past Medical History:   Diagnosis Date    GERD (gastroesophageal reflux disease)     Hypertension      No past surgical history on file.  Family History   Problem Relation Age of Onset    Hypertension Mother     Hypertension Father     Aneurysm Father      Social History     Tobacco Use    Smoking status: Every Day     Packs/day: 0.50     Years: 40.00     Pack years: 20.00     Types: Cigarettes    Smokeless tobacco: Never   Substance Use Topics    Alcohol use: Never    Drug use: Never     Review of Systems   Constitutional: Negative.    HENT: Negative.     Respiratory: Negative.     Cardiovascular: Negative.    Gastrointestinal: Negative.    Genitourinary: Negative.    Musculoskeletal:  Positive for arthralgias.   Neurological: Negative.      Physical Exam     Initial Vitals [11/27/22 1646]   BP Pulse Resp Temp SpO2   (!) 165/65 75 18 98.2 °F (36.8 °C) 98 %      MAP       --         Physical Exam    Nursing note and vitals reviewed.  Constitutional: He appears well-developed and well-nourished.   HENT:   Head: Normocephalic and atraumatic.   Eyes: EOM are normal. Pupils are equal, round, and reactive to light.   Neck: Neck supple.   Left paraspinous tenderness, no spinous tenderness ; full range of motion   Normal range of motion.  Cardiovascular:  Normal rate, regular rhythm and normal heart sounds.           Pulmonary/Chest: Breath sounds normal.   Abdominal: Abdomen is soft.   Musculoskeletal:         General: Normal range of motion.       Cervical back: Normal range of motion and neck supple.     Lymphadenopathy:     He has no cervical adenopathy.   Neurological: He is alert and oriented to person, place, and time.   Skin: Skin is warm and dry.       ED Course   Procedures  Labs Reviewed - No data to display       Imaging Results              X-Ray Cervical Spine AP And Lateral (Preliminary result)  Result time 11/27/22 18:41:51      ED Interpretation by Oj Garza MD (11/27/22 18:41:51, [facility], Emergency Medicine)    Loss of normal curvature, no acute change                                     Medications - No data to display                           Clinical Impression:   Final diagnoses:  [R52] Pain  [M54.2] Neck pain (Primary)  [R03.0] Elevated blood pressure reading        ED Disposition Condition    Discharge Stable          ED Prescriptions       Medication Sig Dispense Start Date End Date Auth. Provider    cyclobenzaprine (FLEXERIL) 10 MG tablet Take 1 tablet (10 mg total) by mouth nightly as needed for Muscle spasms. 30 tablet 11/27/2022 5/26/2023 Oj Garza MD    indomethacin (INDOCIN) 50 MG capsule Take 1 capsule (50 mg total) by mouth 3 (three) times daily as needed (gout pain). 30 capsule 11/27/2022 5/26/2023 Oj Garza MD          Follow-up Information       Follow up With Specialties Details Why Contact Info    [facility] In 1 week As needed [address] [facility] [address] [phone]               Oj Garza MD  11/27/22 3321

## 2022-12-04 ENCOUNTER — HOSPITAL ENCOUNTER (EMERGENCY)
Facility: HOSPITAL | Age: 57
Discharge: HOME | End: 2022-12-04
Attending: EMERGENCY MEDICINE
Payer: MEDICAID

## 2022-12-04 VITALS
DIASTOLIC BLOOD PRESSURE: 89 MMHG | SYSTOLIC BLOOD PRESSURE: 153 MMHG | WEIGHT: 246.94 LBS | OXYGEN SATURATION: 99 % | TEMPERATURE: 99 F | HEART RATE: 78 BPM | BODY MASS INDEX: 32.73 KG/M2 | RESPIRATION RATE: 18 BRPM | HEIGHT: 73 IN

## 2022-12-04 DIAGNOSIS — M54.2 CHRONIC NECK PAIN: Primary | ICD-10-CM

## 2022-12-04 DIAGNOSIS — G89.29 CHRONIC NECK PAIN: Primary | ICD-10-CM

## 2022-12-04 PROCEDURE — 99282 EMERGENCY DEPT VISIT SF MDM: CPT

## 2022-12-04 RX ORDER — TIZANIDINE 4 MG/1
8 TABLET ORAL EVERY 6 HOURS PRN
Qty: 30 TABLET | Refills: 0 | Status: SHIPPED | OUTPATIENT
Start: 2022-12-04 | End: 2022-12-14

## 2022-12-04 RX ORDER — DICLOFENAC SODIUM 75 MG/1
75 TABLET, DELAYED RELEASE ORAL 2 TIMES DAILY PRN
Qty: 20 TABLET | Refills: 0 | OUTPATIENT
Start: 2022-12-04 | End: 2023-02-17

## 2022-12-04 NOTE — ED PROVIDER NOTES
Encounter Date: 12/4/2022       History     Chief Complaint   Patient presents with    Neck Pain     State sudden onset neck pain since thanks giving , denies any trauma or past injury , states seen [facility] unable to find cause still hurting and cant sleep     The patient presents with neck pain.  The onset was chronic increased for 2 weeks.  The course/duration of symptoms is constant.  Location: neck. Type of injury: none and none known.  Radiating pain none.  The character of symptoms is pain.  The degree at present is moderate.  There are exacerbating factors including movement and turning head.  There are relieving factors including rest and immobilization.  Prior episodes: chronic.  Therapy today: none.  Associated symptoms: none.  Additional history: seen at [facility] ER on 11/27 with neg xray.        Review of patient's allergies indicates:  No Known Allergies  Past Medical History:   Diagnosis Date    GERD (gastroesophageal reflux disease)     Hypertension      History reviewed. No pertinent surgical history.  Family History   Problem Relation Age of Onset    Hypertension Mother     Hypertension Father     Aneurysm Father      Social History     Tobacco Use    Smoking status: Every Day     Packs/day: 0.50     Years: 40.00     Pack years: 20.00     Types: Cigarettes    Smokeless tobacco: Never   Substance Use Topics    Alcohol use: Never    Drug use: Never     Review of Systems   Constitutional:  Negative for fever.   HENT:  Negative for sore throat.    Respiratory:  Negative for shortness of breath.    Cardiovascular:  Negative for chest pain.   Gastrointestinal:  Negative for nausea and rectal pain.   Genitourinary:  Negative for dysuria.   Musculoskeletal:  Positive for neck pain. Negative for back pain.   Skin:  Negative for rash.   Neurological:  Negative for weakness.   Hematological:  Does not bruise/bleed easily.   All other systems reviewed and are negative.    Physical Exam     Initial  Vitals [12/04/22 1111]   BP Pulse Resp Temp SpO2   (!) 153/89 78 18 98.6 °F (37 °C) 99 %      MAP       --         Physical Exam    Nursing note and vitals reviewed.  Constitutional: He appears well-developed and well-nourished.   HENT:   Head: Normocephalic and atraumatic.   Neck: Neck supple.   Normal range of motion.  Cardiovascular:  Normal rate, regular rhythm, normal heart sounds and intact distal pulses.           Pulmonary/Chest: Breath sounds normal.   Abdominal: Abdomen is soft. Bowel sounds are normal.   Musculoskeletal:         General: Normal range of motion.      Cervical back: Normal range of motion and neck supple.      Comments: Neck:  Supple, trachea midline, mild cayla cervical paraspinal ttp, FROM, no c/t/l pt, strong equal hand .       Neurological: He is alert and oriented to person, place, and time. He has normal strength.   Skin: Skin is warm and dry.   Psychiatric: He has a normal mood and affect.       ED Course   Procedures  Labs Reviewed - No data to display       Imaging Results    None          Medications - No data to display  Medical Decision Making:   History:   Old Records Summarized: records from clinic visits and records from previous admission(s).  Declines pain medication in the ER.    12:04 PM DISPOSITION: The patient is resting comfortably in no acute distress.  He is hemodynamically stable and is without objective evidence for acute process requiring urgent intervention or hospitalization. I provided counseling to patient with regard to condition, the treatment plan, specific conditions for return, and the importance of follow up. Detailed written and verbal instructions provided to patient and he expressed a verbal understanding of the discharge instructions and overall management plan. Reiterated the importance of medication administration and safety and advised patient to follow up with primary care provider in 3-5 days or sooner if needed.  Answered questions at this  time. The patient is stable for discharge.                           Clinical Impression:   Final diagnoses:  [M54.2, G89.29] Chronic neck pain (Primary)      ED Disposition Condition    Discharge Stable          ED Prescriptions       Medication Sig Dispense Start Date End Date Auth. Provider    diclofenac (VOLTAREN) 75 MG EC tablet Take 1 tablet (75 mg total) by mouth 2 (two) times daily as needed (pain). 20 tablet 12/4/2022 -- CHANTALE Hernandez    tiZANidine (ZANAFLEX) 4 MG tablet Take 2 tablets (8 mg total) by mouth every 6 (six) hours as needed (pain or spasms). May cause drowsiness 30 tablet 12/4/2022 12/14/2022 CHANTALE Hernandez          Follow-up Information       Follow up With Specialties Details Why Contact Info    [facility] In 3 days  [address] [facility] [address]  [phone]      [facility] Emergency Medicine  If symptoms worsen [address] [facility] [address] [phone]             CHANTALE Hernandez  12/04/22 1282

## 2023-01-02 ENCOUNTER — HOSPITAL ENCOUNTER (EMERGENCY)
Facility: HOSPITAL | Age: 58
Discharge: LEFT BEFORE DISCHARGE | End: 2023-01-02
Payer: MEDICAID

## 2023-01-02 VITALS
SYSTOLIC BLOOD PRESSURE: 164 MMHG | HEIGHT: 73 IN | BODY MASS INDEX: 30.36 KG/M2 | TEMPERATURE: 97 F | WEIGHT: 229.06 LBS | HEART RATE: 68 BPM | OXYGEN SATURATION: 99 % | DIASTOLIC BLOOD PRESSURE: 79 MMHG | RESPIRATION RATE: 20 BRPM

## 2023-01-02 LAB
FLUAV AG UPPER RESP QL IA.RAPID: NOT DETECTED
FLUBV AG UPPER RESP QL IA.RAPID: NOT DETECTED
SARS-COV-2 RNA RESP QL NAA+PROBE: NOT DETECTED
STREP A PCR (OHS): NOT DETECTED

## 2023-01-02 PROCEDURE — 87651 STREP A DNA AMP PROBE: CPT | Performed by: EMERGENCY MEDICINE

## 2023-01-02 PROCEDURE — 0240U COVID/FLU A&B PCR: CPT | Performed by: EMERGENCY MEDICINE

## 2023-01-02 PROCEDURE — 99999 HC NO LEVEL OF SERVICE - ED ONLY: CPT

## 2023-02-17 ENCOUNTER — HOSPITAL ENCOUNTER (EMERGENCY)
Facility: HOSPITAL | Age: 58
Discharge: HOME | End: 2023-02-17
Attending: STUDENT IN AN ORGANIZED HEALTH CARE EDUCATION/TRAINING PROGRAM
Payer: MEDICAID

## 2023-02-17 VITALS
SYSTOLIC BLOOD PRESSURE: 142 MMHG | RESPIRATION RATE: 16 BRPM | WEIGHT: 238.13 LBS | TEMPERATURE: 97 F | HEART RATE: 88 BPM | OXYGEN SATURATION: 96 % | BODY MASS INDEX: 31.56 KG/M2 | HEIGHT: 73 IN | DIASTOLIC BLOOD PRESSURE: 85 MMHG

## 2023-02-17 DIAGNOSIS — J32.9 SINUSITIS, UNSPECIFIED CHRONICITY, UNSPECIFIED LOCATION: ICD-10-CM

## 2023-02-17 DIAGNOSIS — R51.9 SINUS HEADACHE: Primary | ICD-10-CM

## 2023-02-17 PROCEDURE — 25000003 PHARM REV CODE 250: Performed by: NURSE PRACTITIONER

## 2023-02-17 PROCEDURE — 99284 EMERGENCY DEPT VISIT MOD MDM: CPT | Mod: 25

## 2023-02-17 RX ORDER — CETIRIZINE HYDROCHLORIDE 10 MG/1
10 TABLET ORAL DAILY
Qty: 14 TABLET | Refills: 0 | Status: SHIPPED | OUTPATIENT
Start: 2023-02-17 | End: 2023-03-15

## 2023-02-17 RX ORDER — DICLOFENAC SODIUM 75 MG/1
75 TABLET, DELAYED RELEASE ORAL 2 TIMES DAILY
Qty: 14 TABLET | Refills: 0 | Status: SHIPPED | OUTPATIENT
Start: 2023-02-17 | End: 2023-02-24

## 2023-02-17 RX ORDER — KETOROLAC TROMETHAMINE 10 MG/1
10 TABLET, FILM COATED ORAL
Status: COMPLETED | OUTPATIENT
Start: 2023-02-17 | End: 2023-02-17

## 2023-02-17 RX ORDER — AMOXICILLIN AND CLAVULANATE POTASSIUM 875; 125 MG/1; MG/1
1 TABLET, FILM COATED ORAL 2 TIMES DAILY
Qty: 14 TABLET | Refills: 0 | Status: SHIPPED | OUTPATIENT
Start: 2023-02-17 | End: 2023-03-15

## 2023-02-17 RX ADMIN — KETOROLAC TROMETHAMINE 10 MG: 10 TABLET, FILM COATED ORAL at 12:02

## 2023-02-17 NOTE — DISCHARGE INSTRUCTIONS
Follow up with your primary care physician in 3-5 days for follow up evaluation.  Take medication as prescribed.  Take pain medication as prescribed for no more than 7 days.

## 2023-02-17 NOTE — ED PROVIDER NOTES
Encounter Date: 2/17/2023       History     Chief Complaint   Patient presents with    Headache     Rt temporal area x 1 week      Pt is a 57 y.o. male who presents to the [facility] ED complaining of Rt sided frontal headache x 1 week. Reports sinus congestion and pain as well. Denies weakness, dizziness, fever, visual changes, abdominal pain, cough, chest pain, or SOB. Hx of HTN and GERD. Denies relief with OTC medications.     Review of patient's allergies indicates:  No Known Allergies  Past Medical History:   Diagnosis Date    GERD (gastroesophageal reflux disease)     Hypertension      No past surgical history on file.  Family History   Problem Relation Age of Onset    Hypertension Mother     Hypertension Father     Aneurysm Father      Social History     Tobacco Use    Smoking status: Every Day     Packs/day: 0.50     Years: 40.00     Pack years: 20.00     Types: Cigarettes    Smokeless tobacco: Never   Substance Use Topics    Alcohol use: Never    Drug use: Never     Review of Systems   Constitutional:  Negative for chills, diaphoresis, fatigue and fever.   HENT:  Positive for sinus pressure and sinus pain. Negative for facial swelling, postnasal drip, rhinorrhea, sore throat and trouble swallowing.    Respiratory:  Negative for cough, chest tightness, shortness of breath and wheezing.    Cardiovascular:  Negative for chest pain, palpitations and leg swelling.   Gastrointestinal:  Negative for abdominal pain, diarrhea, nausea and vomiting.   Genitourinary:  Negative for dysuria, flank pain, hematuria and urgency.   Musculoskeletal:  Negative for arthralgias, back pain and myalgias.   Skin:  Negative for color change and rash.   Neurological:  Negative for dizziness, syncope, weakness and headaches.   Hematological:  Does not bruise/bleed easily.   All other systems reviewed and are negative.    Physical Exam     Initial Vitals [02/17/23 1114]   BP Pulse Resp Temp SpO2   (!) 148/85 69 18 97.5 °F (36.4 °C) 100 %       MAP       --         Physical Exam    Nursing note and vitals reviewed.  Constitutional: Vital signs are normal. He appears well-developed and well-nourished.   HENT:   Head: Normocephalic. Head is without raccoon's eyes and without Blake's sign.       Nose: Mucosal edema and rhinorrhea present. Right sinus exhibits frontal sinus tenderness.   Mouth/Throat: Oropharynx is clear and moist.   Erythema to bilateral nares.      Eyes: Conjunctivae and EOM are normal. Pupils are equal, round, and reactive to light.   Neck: Neck supple.   Normal range of motion.  Cardiovascular:  Normal rate, regular rhythm, normal heart sounds and intact distal pulses.           Pulmonary/Chest: Effort normal and breath sounds normal. No respiratory distress. He has no wheezes. He has no rhonchi. He has no rales. He exhibits no tenderness.   Abdominal: Abdomen is soft and flat. Bowel sounds are normal. There is no abdominal tenderness. There is no rebound, no guarding, no tenderness at McBurney's point and negative Chris's sign.   Musculoskeletal:         General: Normal range of motion.      Cervical back: Normal range of motion and neck supple.     Neurological: He is alert and oriented to person, place, and time. He has normal strength.   Skin: Skin is warm and dry. Capillary refill takes less than 2 seconds.   Psychiatric: He has a normal mood and affect. His behavior is normal. Judgment and thought content normal.       ED Course   Procedures  Labs Reviewed - No data to display       Imaging Results    None          Medications   ketorolac tablet 10 mg (10 mg Oral Given 2/17/23 1200)     Medical Decision Making:   Differential Diagnosis:   Sinusitis  Sinus headache  ED Management:  12:06 PM Reassessed patient at this time. Reports condition has improved. Discussed with patient all pertinent ED information and results. Discussed diagnosis and treatment plan with patient. Follow up instructions and return to ED instruction have been  given. All questions and concerns were addressed at this time. Patient voices understanding of information and instructions. Patient is comfortable with plan and discharge. Patient is stable for discharge.        APC / Resident Notes:   I was not physically present during the history, exam or disposition of this patient. I was available at all times for consultation. ([place])                   Clinical Impression:   Final diagnoses:  [R51.9] Sinus headache (Primary)  [J32.9] Sinusitis, unspecified chronicity, unspecified location        ED Disposition Condition    Discharge Stable          ED Prescriptions       Medication Sig Dispense Start Date End Date Auth. Provider    cetirizine (ZYRTEC) 10 MG tablet Take 1 tablet (10 mg total) by mouth once daily. for 14 days 14 tablet 2/17/2023 3/3/2023 BRADY Davidson Jr.    amoxicillin-clavulanate 875-125mg (AUGMENTIN) 875-125 mg per tablet Take 1 tablet by mouth 2 (two) times daily. 14 tablet 2/17/2023 -- BRADY Davidson Jr.    diclofenac (VOLTAREN) 75 MG EC tablet Take 1 tablet (75 mg total) by mouth 2 (two) times daily. for 7 days 14 tablet 2/17/2023 2/24/2023 BRADY Davidson Jr.          Follow-up Information       Follow up With Specialties Details Why Contact Info    [facility] In 3 days  [address] [facility] [address]  [phone]      [facility] Emergency Medicine In 3 days As needed, If symptoms worsen [address] [facility] [id number] [phone]             BRADY Davidson Jr.  02/17/23 1200       Srinivas Shafer MD  02/17/23 9828

## 2023-03-15 ENCOUNTER — OFFICE VISIT (OUTPATIENT)
Dept: INTERNAL MEDICINE | Facility: CLINIC | Age: 58
End: 2023-03-15
Payer: MEDICAID

## 2023-03-15 VITALS
SYSTOLIC BLOOD PRESSURE: 137 MMHG | DIASTOLIC BLOOD PRESSURE: 79 MMHG | RESPIRATION RATE: 18 BRPM | TEMPERATURE: 98 F | WEIGHT: 236.63 LBS | BODY MASS INDEX: 31.36 KG/M2 | HEART RATE: 61 BPM | HEIGHT: 73 IN

## 2023-03-15 DIAGNOSIS — E66.09 CLASS 1 OBESITY DUE TO EXCESS CALORIES WITH SERIOUS COMORBIDITY AND BODY MASS INDEX (BMI) OF 32.0 TO 32.9 IN ADULT: Chronic | ICD-10-CM

## 2023-03-15 DIAGNOSIS — M25.512 ACUTE PAIN OF LEFT SHOULDER: ICD-10-CM

## 2023-03-15 DIAGNOSIS — E78.2 MIXED HYPERLIPIDEMIA: Chronic | ICD-10-CM

## 2023-03-15 DIAGNOSIS — I10 PRIMARY HYPERTENSION: Primary | Chronic | ICD-10-CM

## 2023-03-15 DIAGNOSIS — M54.2 NECK PAIN: ICD-10-CM

## 2023-03-15 DIAGNOSIS — F17.210 CIGARETTE NICOTINE DEPENDENCE WITHOUT COMPLICATION: Chronic | ICD-10-CM

## 2023-03-15 PROCEDURE — 99214 OFFICE O/P EST MOD 30 MIN: CPT | Mod: 25,S$PBB,, | Performed by: NURSE PRACTITIONER

## 2023-03-15 PROCEDURE — 99214 OFFICE O/P EST MOD 30 MIN: CPT | Mod: PBBFAC | Performed by: NURSE PRACTITIONER

## 2023-03-15 PROCEDURE — 3075F PR MOST RECENT SYSTOLIC BLOOD PRESS GE 130-139MM HG: ICD-10-PCS | Mod: CPTII,,, | Performed by: NURSE PRACTITIONER

## 2023-03-15 PROCEDURE — 1159F PR MEDICATION LIST DOCUMENTED IN MEDICAL RECORD: ICD-10-PCS | Mod: CPTII,,, | Performed by: NURSE PRACTITIONER

## 2023-03-15 PROCEDURE — 99406 BEHAV CHNG SMOKING 3-10 MIN: CPT | Mod: S$PBB,,, | Performed by: NURSE PRACTITIONER

## 2023-03-15 PROCEDURE — 3078F PR MOST RECENT DIASTOLIC BLOOD PRESSURE < 80 MM HG: ICD-10-PCS | Mod: CPTII,,, | Performed by: NURSE PRACTITIONER

## 2023-03-15 PROCEDURE — 3075F SYST BP GE 130 - 139MM HG: CPT | Mod: CPTII,,, | Performed by: NURSE PRACTITIONER

## 2023-03-15 PROCEDURE — 1159F MED LIST DOCD IN RCRD: CPT | Mod: CPTII,,, | Performed by: NURSE PRACTITIONER

## 2023-03-15 PROCEDURE — 99406 PR TOBACCO USE CESSATION INTERMEDIATE 3-10 MINUTES: ICD-10-PCS | Mod: S$PBB,,, | Performed by: NURSE PRACTITIONER

## 2023-03-15 PROCEDURE — 1160F PR REVIEW ALL MEDS BY PRESCRIBER/CLIN PHARMACIST DOCUMENTED: ICD-10-PCS | Mod: CPTII,,, | Performed by: NURSE PRACTITIONER

## 2023-03-15 PROCEDURE — 3008F PR BODY MASS INDEX (BMI) DOCUMENTED: ICD-10-PCS | Mod: CPTII,,, | Performed by: NURSE PRACTITIONER

## 2023-03-15 PROCEDURE — 3078F DIAST BP <80 MM HG: CPT | Mod: CPTII,,, | Performed by: NURSE PRACTITIONER

## 2023-03-15 PROCEDURE — 99214 PR OFFICE/OUTPT VISIT, EST, LEVL IV, 30-39 MIN: ICD-10-PCS | Mod: 25,S$PBB,, | Performed by: NURSE PRACTITIONER

## 2023-03-15 PROCEDURE — 3008F BODY MASS INDEX DOCD: CPT | Mod: CPTII,,, | Performed by: NURSE PRACTITIONER

## 2023-03-15 PROCEDURE — 1160F RVW MEDS BY RX/DR IN RCRD: CPT | Mod: CPTII,,, | Performed by: NURSE PRACTITIONER

## 2023-03-15 RX ORDER — ALLOPURINOL 300 MG/1
300 TABLET ORAL DAILY
Qty: 90 TABLET | Refills: 1 | Status: SHIPPED | OUTPATIENT
Start: 2023-03-15 | End: 2023-08-09 | Stop reason: SDUPTHER

## 2023-03-15 RX ORDER — INDOMETHACIN 50 MG/1
50 CAPSULE ORAL 3 TIMES DAILY
COMMUNITY
Start: 2023-02-09 | End: 2023-03-15

## 2023-03-15 RX ORDER — INDOMETHACIN 25 MG/1
75 CAPSULE ORAL 2 TIMES DAILY WITH MEALS
Qty: 180 CAPSULE | Refills: 2 | Status: SHIPPED | OUTPATIENT
Start: 2023-03-15 | End: 2023-06-27

## 2023-03-15 RX ORDER — CYCLOBENZAPRINE HCL 10 MG
10 TABLET ORAL NIGHTLY
Qty: 30 TABLET | Refills: 6 | Status: SHIPPED | OUTPATIENT
Start: 2023-03-15 | End: 2023-06-26 | Stop reason: SDUPTHER

## 2023-03-15 RX ORDER — OLOPATADINE HYDROCHLORIDE 1 MG/ML
1 SOLUTION/ DROPS OPHTHALMIC 2 TIMES DAILY
Qty: 5 ML | Refills: 2 | Status: SHIPPED | OUTPATIENT
Start: 2023-03-15 | End: 2023-08-09

## 2023-03-15 RX ORDER — CYCLOBENZAPRINE HCL 10 MG
10 TABLET ORAL NIGHTLY
COMMUNITY
Start: 2023-02-09 | End: 2023-03-15 | Stop reason: SDUPTHER

## 2023-03-15 RX ORDER — HYDROCHLOROTHIAZIDE 25 MG/1
25 TABLET ORAL DAILY
Qty: 90 TABLET | Refills: 1 | Status: SHIPPED | OUTPATIENT
Start: 2023-03-15 | End: 2023-08-09 | Stop reason: SDUPTHER

## 2023-03-15 RX ORDER — AMLODIPINE BESYLATE 5 MG/1
5 TABLET ORAL DAILY
Qty: 90 TABLET | Refills: 1 | Status: SHIPPED | OUTPATIENT
Start: 2023-03-15 | End: 2023-08-09 | Stop reason: SDUPTHER

## 2023-03-15 NOTE — PROGRESS NOTES
Patient ID: [id number]     Chief Complaint: No chief complaint on file.    HPI:     Wilfrido Henriquez is a 57 y.o. male with diagnosis of HTN, HLD, Gout, Sleep Apnea, Tobacco Use. Patient seen in clinic today for medication refills. Patient last seen in clinic on 2022.   Today, patient states left shoulder pain, decrease ROM. Patient denies injury. Patient currently prescribed Indomethacin 50 mg bid prn but states not helping.  Patient did not complete labs prior to appointment.   Patient continued on Amlodipine 5 mg daily and HCTZ 25 mg daily for HTN. Patient states taking medication as prescribed, tolerating well. Patient states he does not check his B/P at home. Patient denies SOB, chest pain.   3+ blood noted in urine on labs done in May 2022. Patient denies dysuria, abdominal pain. CT abdomen/pelvis ordered but not completed.   Patient also states watery, itchy eyes at times. Patient denies change in vision, eye pain. Patient started on Systane eye drops but states didn't help.   Patient denies any other acute complaints at this time.      Patient seen in ED on 2021 for neck pain. Cervical X-ray indicated degenerative disc disease and spondylosis. Lumbar X-ray indicated. Minimal hypertrophic facet changes at the lower lumbar spine. Minimal degenerative changes at the SI joints.Patient prescribed Diclofenac and Robaxin and discharged home.     Review of patient's allergies indicates:  No Known Allergies    Breast Cancer Screening: N/A  Cervical Cancer Screening: N/A  Colorectal Cancer Screening: Colonoscopy on 2021 with Dr. Lombardi, will try to obtain report  Diabetic Eye Exam: N/A  Diabetic Foot Exam: N/A  Lung Cancer Screening: ordered  Prostate Cancer Screenin.5  AAA Screening: deferred due to age  Osteoporosis Screening: N/A  Medicare Wellness: N/A  Immunizations:   Immunization History   Administered Date(s) Administered    COVID-19, MRNA, LN-S, PF (MODERNA FULL 0.5 ML DOSE) 08/15/2021,  "09/17/2021    Influenza - Trivalent - PF (ADULT) 12/18/2015     History reviewed. No pertinent surgical history.    family history includes Aneurysm in his father; Hypertension in his father and mother.    Social History     Socioeconomic History    Marital status: Single   Occupational History    Occupation: unemployed   Tobacco Use    Smoking status: Every Day     Packs/day: 0.50     Years: 40.00     Pack years: 20.00     Types: Cigarettes    Smokeless tobacco: Never   Substance and Sexual Activity    Alcohol use: Never    Drug use: Never    Sexual activity: Yes     Partners: Female     Current Outpatient Medications   Medication Instructions    allopurinoL (ZYLOPRIM) 300 mg, Oral, Daily    amLODIPine (NORVASC) 5 mg, Oral, Daily    cyclobenzaprine (FLEXERIL) 10 mg, Oral, Nightly    hydroCHLOROthiazide (HYDRODIURIL) 25 mg, Oral, Daily    indomethacin (INDOCIN) 75 mg, Oral, 2 times daily with meals    olopatadine (PATANOL) 0.1 % ophthalmic solution 1 drop, Both Eyes, 2 times daily    omeprazole (PRILOSEC) 20 mg, Oral, Daily       Subjective:     Review of Systems   Constitutional: Negative.    HENT: Negative.     Eyes: Negative.    Respiratory: Negative.     Cardiovascular: Negative.    Gastrointestinal: Negative.    Endocrine: Negative.    Genitourinary: Negative.    Musculoskeletal:  Positive for arthralgias.   Skin: Negative.    Allergic/Immunologic: Negative.    Neurological: Negative.    Hematological: Negative.    Psychiatric/Behavioral: Negative.       Objective:     Visit Vitals  /79 (BP Location: Left arm, Patient Position: Sitting)   Pulse 61   Temp 97.7 °F (36.5 °C)   Resp 18   Ht 6' 1" (1.854 m)   Wt 107.3 kg (236 lb 9.6 oz)   BMI 31.22 kg/m²       Physical Exam  Vitals reviewed.   Constitutional:       Appearance: Normal appearance.   HENT:      Head: Normocephalic and atraumatic.      Mouth/Throat:      Mouth: Mucous membranes are moist.      Pharynx: Oropharynx is clear.   Eyes:      Extraocular " Movements: Extraocular movements intact.      Conjunctiva/sclera: Conjunctivae normal.      Pupils: Pupils are equal, round, and reactive to light.   Cardiovascular:      Rate and Rhythm: Normal rate and regular rhythm.      Heart sounds: Normal heart sounds.   Pulmonary:      Effort: Pulmonary effort is normal.      Breath sounds: Normal breath sounds.   Abdominal:      General: Bowel sounds are normal.   Musculoskeletal:         General: Normal range of motion.      Cervical back: Normal range of motion.   Skin:     General: Skin is warm and dry.   Neurological:      Mental Status: He is alert and oriented to person, place, and time.   Psychiatric:         Mood and Affect: Mood normal.         Behavior: Behavior normal.       Labs Reviewed:     Hematology:  Lab Results   Component Value Date    WBC 6.5 10/23/2022    HGB 14.6 10/23/2022    HCT 42.2 10/23/2022     10/23/2022     Chemistry:  Lab Results   Component Value Date     10/23/2022    K 3.3 (L) 10/23/2022    CHLORIDE 102 10/23/2022    BUN 12.6 10/23/2022    CREATININE 0.99 10/23/2022    EGFRNORACEVR >60 10/23/2022    GLUCOSE 140 (H) 10/23/2022    CALCIUM 9.7 10/23/2022    ALKPHOS 58 10/23/2022    LABPROT 6.9 10/23/2022    ALBUMIN 4.3 10/23/2022    BILIDIR 0.1 08/19/2021    IBILI 0.30 08/19/2021    AST 19 10/23/2022    ALT 21 10/23/2022     Lab Results   Component Value Date    HGBA1C 5.6 05/12/2022     Lipid Panel:  Lab Results   Component Value Date    CHOL 189 05/12/2022    HDL 40 05/12/2022    .00 05/12/2022    TRIG 180 (H) 05/12/2022    TOTALCHOLEST 5 05/12/2022     Thyroid:  Lab Results   Component Value Date    TSH 1.1096 05/12/2022     Urine:  Lab Results   Component Value Date    COLORUA Colorless (A) 11/02/2022    APPEARANCEUA Clear 11/02/2022    SGUA 1.008 11/02/2022    PHUA 5.5 11/02/2022    PROTEINUA Negative 11/02/2022    GLUCOSEUA Normal 11/02/2022    KETONESUA Negative 11/02/2022    BLOODUA 2+ (A) 11/02/2022    NITRITESUA  Negative 11/02/2022    LEUKOCYTESUR Negative 11/02/2022    RBCUA 11-20 (A) 11/02/2022    WBCUA 0-5 11/02/2022    BACTERIA None Seen 11/02/2022    SQEPUA None Seen 11/02/2022    HYALINECASTS None Seen 11/02/2022    CREATRANDUR 228.3 (H) 05/12/2022        Assessment:       ICD-10-CM ICD-9-CM   1. Primary hypertension  I10 401.9   2. Mixed hyperlipidemia  E78.2 272.2   3. Acute pain of left shoulder  M25.512 719.41   4. Neck pain  M54.2 723.1   5. Class 1 obesity due to excess calories with serious comorbidity and body mass index (BMI) of 32.0 to 32.9 in adult  E66.09 278.00    Z68.32 V85.32   6. Cigarette nicotine dependence without complication  F17.210 305.1        Plan:     1. Primary hypertension  Vitals:    03/15/23 1253   BP: 137/79   Pulse: 61   Resp: 18   Temp: 97.7 °F (36.5 °C)      Urine Creatinine   Date Value Ref Range Status   05/12/2022 228.3 (H) 63.0 - 166.0 mg/dL Final     No results found for: MICROALBUR   Results for orders placed or performed during the hospital encounter of 10/23/22   EKG 12-lead (Chest Pain) Age >30    Collection Time: 10/23/22  8:59 AM    Narrative    Test Reason : R07.9,    Vent. Rate : 084 BPM     Atrial Rate : 084 BPM     P-R Int : 158 ms          QRS Dur : 108 ms      QT Int : 368 ms       P-R-T Axes : 079 064 036 degrees     QTc Int : 434 ms    Normal sinus rhythm  Normal ECG  No previous ECGs available  Confirmed by Zeynep Woods MD ([id number]) on 10/25/2022 5:56:16 PM    Referred By: [email or web address]   SELF           Confirmed By:Zeynep Woods MD   Continue Amlodipine 5 mg daily, HCTZ 25 mg daily  DASH diet  Encouraged home blood pressure monitoring  - CBC Auto Differential; Future  - Comprehensive Metabolic Panel; Future  - Urinalysis; Future  - Microalbumin/Creatinine Ratio, Urine; Future  - TSH; Future  - Urinalysis    2. Mixed hyperlipidemia  No medications noted  Weight loss encouraged  Low fat/high fiber diet  Increase physical activity  Tobacco cessation  encouraged  Cholesterol Total   Date Value Ref Range Status   05/12/2022 189 <=200 mg/dL Final     HDL Cholesterol   Date Value Ref Range Status   05/12/2022 40 35 - 60 mg/dL Final     Triglyceride   Date Value Ref Range Status   05/12/2022 180 (H) 34 - 140 mg/dL Final     LDL Cholesterol   Date Value Ref Range Status   05/12/2022 113.00 50.00 - 140.00 mg/dL Final   - Lipid Panel; Future    3. Acute pain of left shoulder  Increase Indomethacin to 75 mg bid  - X-Ray Shoulder 2 or More Views Left; Future  - Ambulatory referral/consult to Physical/Occupational Therapy; Future    4. Neck pain  - Ambulatory referral/consult to Physical/Occupational Therapy; Future    5. Class 1 obesity due to excess calories with serious comorbidity and body mass index (BMI) of 32.0 to 32.9 in adult  Body mass index is 31.22 kg/m².  Thyroid Stimulating Hormone   Date Value Ref Range Status   05/12/2022 1.1096 0.3500 - 4.9400 uIU/mL Final     Hemoglobin A1c   Date Value Ref Range Status   05/12/2022 5.6 <=7.0 % Final   Sleep Study: none noted  Weight Loss Encouraged  Increase Physical Activity  - Vitamin D; Future    6. Cigarette nicotine dependence without complication  Smoking cessation education provided, encouraged. Informed of smoking cessation program. Patient refused smoking cessation at this time. I spent 3 minutes discussing smoking cessation with patient.       Follow up in about 6 months (around 9/15/2023) for Labs. In addition to their scheduled follow up, the patient has also been instructed to follow up on as needed basis.     Ruth Dunlap, BRADY

## 2023-03-27 ENCOUNTER — HOSPITAL ENCOUNTER (EMERGENCY)
Facility: HOSPITAL | Age: 58
Discharge: LEFT BEFORE DISCHARGE | End: 2023-03-27
Payer: MEDICAID

## 2023-03-27 VITALS
OXYGEN SATURATION: 100 % | HEIGHT: 73 IN | DIASTOLIC BLOOD PRESSURE: 85 MMHG | BODY MASS INDEX: 31.14 KG/M2 | SYSTOLIC BLOOD PRESSURE: 167 MMHG | RESPIRATION RATE: 16 BRPM | HEART RATE: 77 BPM | WEIGHT: 235 LBS | TEMPERATURE: 99 F

## 2023-03-27 DIAGNOSIS — R07.9 CHEST PAIN: ICD-10-CM

## 2023-03-27 PROCEDURE — 93005 ELECTROCARDIOGRAM TRACING: CPT

## 2023-03-27 PROCEDURE — 99900041 HC LEFT WITHOUT BEING SEEN- EMERGENCY

## 2023-04-09 NOTE — ASSESSMENT & PLAN NOTE
Attempted to call report to PCU, was advised it was unknown who would be taking the patient at this time. I told orlin alexis to just call back to the ED for report when they figured it out.    B/P: 136/77  UA Creatinine: ordered  UA Microalbumin: ordered  [id number]  Continue Amlodipine 5 mg daily, HCTZ 25 mg daily  DASH diet  Encouraged home blood pressure monitoring

## 2023-04-25 ENCOUNTER — HOSPITAL ENCOUNTER (EMERGENCY)
Facility: HOSPITAL | Age: 58
Discharge: HOME | End: 2023-04-25
Attending: FAMILY MEDICINE
Payer: MEDICAID

## 2023-04-25 VITALS
WEIGHT: 232.81 LBS | HEIGHT: 73 IN | OXYGEN SATURATION: 100 % | HEART RATE: 60 BPM | SYSTOLIC BLOOD PRESSURE: 162 MMHG | BODY MASS INDEX: 30.85 KG/M2 | TEMPERATURE: 98 F | RESPIRATION RATE: 18 BRPM | DIASTOLIC BLOOD PRESSURE: 85 MMHG

## 2023-04-25 DIAGNOSIS — I10 PRIMARY HYPERTENSION: Primary | Chronic | ICD-10-CM

## 2023-04-25 DIAGNOSIS — R07.9 NONSPECIFIC CHEST PAIN: ICD-10-CM

## 2023-04-25 DIAGNOSIS — E11.9 DIABETES MELLITUS, NEW ONSET: ICD-10-CM

## 2023-04-25 DIAGNOSIS — K21.9 GASTROESOPHAGEAL REFLUX DISEASE, UNSPECIFIED WHETHER ESOPHAGITIS PRESENT: ICD-10-CM

## 2023-04-25 LAB
ALBUMIN SERPL-MCNC: 4.7 G/DL (ref 3.5–5)
ALBUMIN/GLOB SERPL: 1.5 RATIO (ref 1.1–2)
ALP SERPL-CCNC: 83 UNIT/L (ref 40–150)
ALT SERPL-CCNC: 23 UNIT/L (ref 0–55)
AST SERPL-CCNC: 15 UNIT/L (ref 5–34)
BASOPHILS # BLD AUTO: 0.04 X10(3)/MCL (ref 0–0.2)
BASOPHILS NFR BLD AUTO: 0.6 %
BILIRUBIN DIRECT+TOT PNL SERPL-MCNC: 0.5 MG/DL
BUN SERPL-MCNC: 9.2 MG/DL (ref 8.4–25.7)
CALCIUM SERPL-MCNC: 10.6 MG/DL (ref 8.4–10.2)
CHLORIDE SERPL-SCNC: 100 MMOL/L (ref 98–107)
CK MB SERPL-MCNC: 2 NG/ML
CK SERPL-CCNC: 251 U/L (ref 30–200)
CO2 SERPL-SCNC: 29 MMOL/L (ref 22–29)
CREAT SERPL-MCNC: 1.2 MG/DL (ref 0.73–1.18)
EOSINOPHIL # BLD AUTO: 0.04 X10(3)/MCL (ref 0–0.9)
EOSINOPHIL NFR BLD AUTO: 0.6 %
ERYTHROCYTE [DISTWIDTH] IN BLOOD BY AUTOMATED COUNT: 12.3 % (ref 11.5–17)
GFR SERPLBLD CREATININE-BSD FMLA CKD-EPI: >60 MLS/MIN/1.73/M2
GLOBULIN SER-MCNC: 3.2 GM/DL (ref 2.4–3.5)
GLUCOSE SERPL-MCNC: 332 MG/DL (ref 74–100)
HCT VFR BLD AUTO: 44.5 % (ref 42–52)
HGB BLD-MCNC: 16.3 G/DL (ref 14–18)
IMM GRANULOCYTES # BLD AUTO: 0.01 X10(3)/MCL (ref 0–0.04)
IMM GRANULOCYTES NFR BLD AUTO: 0.2 %
LYMPHOCYTES # BLD AUTO: 2.71 X10(3)/MCL (ref 0.6–4.6)
LYMPHOCYTES NFR BLD AUTO: 41.6 %
MCH RBC QN AUTO: 30.4 PG (ref 27–31)
MCHC RBC AUTO-ENTMCNC: 36.6 G/DL (ref 33–36)
MCV RBC AUTO: 82.9 FL (ref 80–94)
MONOCYTES # BLD AUTO: 0.39 X10(3)/MCL (ref 0.1–1.3)
MONOCYTES NFR BLD AUTO: 6 %
NEUTROPHILS # BLD AUTO: 3.32 X10(3)/MCL (ref 2.1–9.2)
NEUTROPHILS NFR BLD AUTO: 51 %
NRBC BLD AUTO-RTO: 0 %
PLATELET # BLD AUTO: 263 X10(3)/MCL (ref 130–400)
PMV BLD AUTO: 11.2 FL (ref 7.4–10.4)
POTASSIUM SERPL-SCNC: 3.8 MMOL/L (ref 3.5–5.1)
PROT SERPL-MCNC: 7.9 GM/DL (ref 6.4–8.3)
RBC # BLD AUTO: 5.37 X10(6)/MCL (ref 4.7–6.1)
SODIUM SERPL-SCNC: 136 MMOL/L (ref 136–145)
TROPONIN I SERPL-MCNC: <0.01 NG/ML (ref 0–0.04)
WBC # SPEC AUTO: 6.5 X10(3)/MCL (ref 4.5–11.5)

## 2023-04-25 PROCEDURE — 99285 EMERGENCY DEPT VISIT HI MDM: CPT | Mod: 25

## 2023-04-25 PROCEDURE — 96360 HYDRATION IV INFUSION INIT: CPT

## 2023-04-25 PROCEDURE — 82553 CREATINE MB FRACTION: CPT | Performed by: PHYSICIAN ASSISTANT

## 2023-04-25 PROCEDURE — 82962 GLUCOSE BLOOD TEST: CPT

## 2023-04-25 PROCEDURE — 93005 ELECTROCARDIOGRAM TRACING: CPT

## 2023-04-25 PROCEDURE — 84484 ASSAY OF TROPONIN QUANT: CPT | Performed by: PHYSICIAN ASSISTANT

## 2023-04-25 PROCEDURE — 85025 COMPLETE CBC W/AUTO DIFF WBC: CPT | Performed by: PHYSICIAN ASSISTANT

## 2023-04-25 PROCEDURE — 80053 COMPREHEN METABOLIC PANEL: CPT | Performed by: PHYSICIAN ASSISTANT

## 2023-04-25 PROCEDURE — 82550 ASSAY OF CK (CPK): CPT | Performed by: PHYSICIAN ASSISTANT

## 2023-04-25 PROCEDURE — 25000003 PHARM REV CODE 250: Performed by: NURSE PRACTITIONER

## 2023-04-25 RX ORDER — LIDOCAINE HYDROCHLORIDE 20 MG/ML
15 SOLUTION OROPHARYNGEAL ONCE
Status: COMPLETED | OUTPATIENT
Start: 2023-04-25 | End: 2023-04-25

## 2023-04-25 RX ORDER — MAG HYDROX/ALUMINUM HYD/SIMETH 200-200-20
30 SUSPENSION, ORAL (FINAL DOSE FORM) ORAL ONCE
Status: COMPLETED | OUTPATIENT
Start: 2023-04-25 | End: 2023-04-25

## 2023-04-25 RX ORDER — INSULIN PUMP SYRINGE, 3 ML
EACH MISCELLANEOUS
Qty: 1 EACH | Refills: 0 | Status: SHIPPED | OUTPATIENT
Start: 2023-04-25

## 2023-04-25 RX ORDER — METFORMIN HYDROCHLORIDE 500 MG/1
500 TABLET ORAL 2 TIMES DAILY WITH MEALS
Qty: 60 TABLET | Refills: 2 | Status: SHIPPED | OUTPATIENT
Start: 2023-04-25 | End: 2023-08-09 | Stop reason: SINTOL

## 2023-04-25 RX ORDER — LANCETS
1 EACH MISCELLANEOUS 2 TIMES DAILY
Qty: 50 EACH | Refills: 3 | Status: SHIPPED | OUTPATIENT
Start: 2023-04-25

## 2023-04-25 RX ADMIN — ALUMINUM HYDROXIDE, MAGNESIUM HYDROXIDE, AND SIMETHICONE 30 ML: 200; 200; 20 SUSPENSION ORAL at 11:04

## 2023-04-25 RX ADMIN — LIDOCAINE HYDROCHLORIDE 15 ML: 20 SOLUTION ORAL at 11:04

## 2023-04-25 RX ADMIN — SODIUM CHLORIDE 1000 ML: 9 INJECTION, SOLUTION INTRAVENOUS at 11:04

## 2023-04-25 NOTE — ED PROVIDER NOTES
"Encounter Date: 4/25/2023       History     Chief Complaint   Patient presents with    Chest Pain     Left sided burning chest pain since yesterday. Was also having LLQ abd pain earlier in the week as well that has subsided.      Pt is a 57 y.o. male who presents to the [facility] ED complaining of Lt lower chest pain which has been intermittent x 2 days. Admits symptoms began after he ate some "greasy food." Denies SOB, weakness, dizziness, fever, vomiting, nausea, or loss of bowel or bladder control. Reports being diagnosed with GERD in the past. Denies relief with OTC medications.     Review of patient's allergies indicates:  No Known Allergies  Past Medical History:   Diagnosis Date    GERD (gastroesophageal reflux disease)     Hypertension      No past surgical history on file.  Family History   Problem Relation Age of Onset    Hypertension Mother     Hypertension Father     Aneurysm Father      Social History     Tobacco Use    Smoking status: Every Day     Years: 40.00     Types: Cigarettes    Smokeless tobacco: Never   Substance Use Topics    Alcohol use: Never    Drug use: Yes     Frequency: 7.0 times per week     Types: Marijuana     Review of Systems   Constitutional:  Negative for chills, diaphoresis, fatigue and fever.   HENT:  Negative for facial swelling, postnasal drip, rhinorrhea, sinus pressure, sinus pain, sore throat and trouble swallowing.    Respiratory:  Negative for cough, chest tightness, shortness of breath and wheezing.    Cardiovascular:  Positive for chest pain. Negative for palpitations and leg swelling.   Gastrointestinal:  Negative for abdominal pain, diarrhea, nausea and vomiting.   Genitourinary:  Negative for dysuria, flank pain, hematuria and urgency.   Musculoskeletal:  Negative for arthralgias, back pain and myalgias.   Skin:  Negative for color change and rash.   Neurological:  Negative for dizziness, syncope, weakness and headaches.   Hematological:  Does not bruise/bleed easily. "   All other systems reviewed and are negative.    Physical Exam     Initial Vitals [04/25/23 0906]   BP Pulse Resp Temp SpO2   (!) 162/85 60 18 97.5 °F (36.4 °C) 100 %      MAP       --         Physical Exam    Nursing note and vitals reviewed.  Constitutional: Vital signs are normal. He appears well-developed and well-nourished.   HENT:   Head: Normocephalic.   Nose: Nose normal.   Mouth/Throat: Oropharynx is clear and moist.   Eyes: Conjunctivae and EOM are normal. Pupils are equal, round, and reactive to light.   Neck: Neck supple.   Normal range of motion.  Cardiovascular:  Normal rate, regular rhythm, normal heart sounds and intact distal pulses.           Pulmonary/Chest: Effort normal and breath sounds normal. No respiratory distress. He has no wheezes. He has no rhonchi. He has no rales. Chest wall is not dull to percussion. He exhibits tenderness. He exhibits no bony tenderness, no crepitus, no edema, no swelling and no retraction.     Abdominal: Abdomen is soft and flat. Bowel sounds are normal. There is no abdominal tenderness. There is no rebound, no guarding, no tenderness at McBurney's point and negative Chris's sign.   Musculoskeletal:         General: Normal range of motion.      Cervical back: Normal range of motion and neck supple.     Neurological: He is alert and oriented to person, place, and time. He has normal strength.   Skin: Skin is warm and dry. Capillary refill takes less than 2 seconds.   Psychiatric: He has a normal mood and affect. His behavior is normal. Judgment and thought content normal.       ED Course   Procedures  Labs Reviewed   COMPREHENSIVE METABOLIC PANEL - Abnormal; Notable for the following components:       Result Value    Glucose Level 332 (*)     Creatinine 1.20 (*)     Calcium Level Total 10.6 (*)     All other components within normal limits   CK - Abnormal; Notable for the following components:    Creatine Kinase 251 (*)     All other components within normal limits    CBC WITH DIFFERENTIAL - Abnormal; Notable for the following components:    MCHC 36.6 (*)     MPV 11.2 (*)     All other components within normal limits   TROPONIN I - Normal   CK-MB - Normal   CBC W/ AUTO DIFFERENTIAL    Narrative:     The following orders were created for panel order CBC auto differential.  Procedure                               Abnormality         Status                     ---------                               -----------         ------                     CBC with Differential[[id number]]        Abnormal            Final result                 Please view results for these tests on the individual orders.   EXTRA TUBES    Narrative:     The following orders were created for panel order EXTRA TUBES.  Procedure                               Abnormality         Status                     ---------                               -----------         ------                     Light Blue Top Hold[[id number]]                              In process                 Red Top Hold[[id number]]                                     In process                 Gold Top Hold[[id number]]                                    In process                   Please view results for these tests on the individual orders.   LIGHT BLUE TOP HOLD   RED TOP HOLD   GOLD TOP HOLD        ECG Results              EKG 12-lead (Preliminary result)  Result time 04/25/23 10:27:38      Wet Read by Sathya Kang MD (04/25/23 10:27:38, [facility], Emergency Medicine)    Normal sinus rhythm at a rate of 70, no signs of ST elevation or depression, normal axis, normal intervals with a QTC of 401                      In process by Interface, Lab In [facility] (04/25/23 10:24:48)                   Narrative:    Test Reason : I10,    Vent. Rate : 070 BPM     Atrial Rate : 070 BPM     P-R Int : 152 ms          QRS Dur : 102 ms      QT Int : 372 ms       P-R-T Axes : 084 078 070 degrees     QTc Int : 401 ms    Normal  sinus rhythm  Normal ECG  When compared with ECG of 27-MAR-2023 19:02,  Nonspecific T wave abnormality no longer evident in Inferior leads    Referred By: [email or web address]   SELF           Confirmed By:                                   Imaging Results              X-Ray Chest PA And Lateral (Final result)  Result time 04/25/23 10:44:34      Final result by Hussain Cotto MD (04/25/23 10:44:34)                   Impression:      No acute cardiopulmonary process identified.      Electronically signed by: Hussain Cotto  Date:    04/25/2023  Time:    10:44               Narrative:    EXAMINATION:  XR CHEST PA AND LATERAL    CLINICAL HISTORY:  chest pain;    TECHNIQUE:  Two-view    COMPARISON:  May 5, 2021.    FINDINGS:  Cardiopericardial silhouette is within normal limits.  Bilateral symmetrical lower chest circumscribed opacities likely represent nipple shadows.  Lungs are without dense focal or segmental consolidation, congestion, pleural effusion or pneumothorax.                                       Medications   sodium chloride 0.9% bolus 1,000 mL 1,000 mL (1,000 mLs Intravenous New Bag 4/25/23 1126)   aluminum-magnesium hydroxide-simethicone 200-200-20 mg/5 mL suspension 30 mL (30 mLs Oral Given 4/25/23 1126)     And   LIDOcaine HCl 2% oral solution 15 mL (15 mLs Oral Given 4/25/23 1126)     Medical Decision Making:   Differential Diagnosis:   GERD  AMI  Nonspecific chest pain  Clinical Tests:   Lab Tests: Ordered and Reviewed  Radiological Study: Ordered and Reviewed  Medical Tests: Ordered and Reviewed  ED Management:  11:38 AM Reassessed patient at this time. On review of pt lab values, elevated CBG noted. Review of pt chart shows multiple glucose levels just under 200. I will be placing pt on metformin as well as providing him equipment to start checking his CBG at home. Stressed the need for pt to seek follow up with his PCP for evaluation of suspected new onset DM. Discussed with patient all pertinent ED  information and results. Discussed diagnosis and treatment plan with patient. Follow up instructions and return to ED instruction have been given. All questions and concerns were addressed at this time. Patient voices understanding of information and instructions. Patient is comfortable with plan and discharge. Patient is stable for discharge.              ED Course as of 04/25/23 1142   Tue Apr 25, 2023   1027 EKG is nonischemic [RK]      ED Course User Index  [RK] Sathya Kang MD                 Clinical Impression:   Final diagnoses:  [K21.9] Gastroesophageal reflux disease, unspecified whether esophagitis present  [R07.9] Nonspecific chest pain  [E11.9] Diabetes mellitus, new onset        ED Disposition Condition    Discharge Stable          ED Prescriptions       Medication Sig Dispense Start Date End Date Auth. Provider    metFORMIN (GLUCOPHAGE) 500 MG tablet Take 1 tablet (500 mg total) by mouth 2 (two) times daily with meals. 60 tablet 4/25/2023 5/25/2023 Venkatesh Brizuela Jr., BRADY    blood sugar diagnostic Strp 1 each by Misc.(Non-Drug; Combo Route) route 2 (two) times a day. 50 each 4/25/2023 -- BRADY Davidson Jr.    lancets (ONETOUCH ULTRASOFT LANCETS) Misc 1 Box by Misc.(Non-Drug; Combo Route) route 2 (two) times a day. 50 each 4/25/2023 -- BRADY Davidson Jr.    blood-glucose meter (FREESTYLE SYSTEM KIT) kit Use as instructed 1 each 4/25/2023 -- BRADY Davidson Jr.          Follow-up Information       Follow up With Specialties Details Why Contact Info    [facility] In 3 days  [address] [facility] [address]  [phone]      [facility] Emergency Medicine In 3 days As needed, If symptoms worsen [address] [address] [address] [phone]             BRADY Davidson Jr.  04/25/23 1141

## 2023-04-25 NOTE — DISCHARGE INSTRUCTIONS
Follow up with your primary care physician in 3-5 days for follow up evaluation.  Take medication as prescribed.  Return to the [facility] ED immediately for worsening chest pain, SOB, or fever.   Avoid greasy or spicy foods.

## 2023-04-25 NOTE — FIRST PROVIDER EVALUATION
"Medical screening examination initiated.  I have conducted a focused provider triage encounter, findings are as follows:    Brief history of present illness:  left chest pain X 2 days    Vitals:    04/25/23 0906   BP: (!) 162/85   Pulse: 60   Resp: 18   Temp: 97.5 °F (36.4 °C)   TempSrc: Oral   SpO2: 100%   Weight: 105.6 kg (232 lb 12.9 oz)   Height: 6' 1" (1.854 m)       Pertinent physical exam:  HTN in triage    Brief workup plan:  cardiac workup initiated    Preliminary workup initiated; this workup will be continued and followed by the physician or advanced practice provider that is assigned to the patient when roomed.  "

## 2023-04-26 LAB — POCT GLUCOSE: 198 MG/DL (ref 70–110)

## 2023-04-28 NOTE — ASSESSMENT & PLAN NOTE
Smoking cessation education provided, encouraged. Patient refused smoking cessation at this time.    Mohini Harvey

## 2023-06-01 ENCOUNTER — HOSPITAL ENCOUNTER (EMERGENCY)
Facility: HOSPITAL | Age: 58
Discharge: HOME | End: 2023-06-01
Attending: EMERGENCY MEDICINE
Payer: MEDICAID

## 2023-06-01 VITALS
HEIGHT: 73 IN | RESPIRATION RATE: 16 BRPM | OXYGEN SATURATION: 98 % | DIASTOLIC BLOOD PRESSURE: 80 MMHG | HEART RATE: 65 BPM | SYSTOLIC BLOOD PRESSURE: 148 MMHG | WEIGHT: 228.19 LBS | BODY MASS INDEX: 30.24 KG/M2 | TEMPERATURE: 99 F

## 2023-06-01 DIAGNOSIS — L29.9 ITCHING: Primary | ICD-10-CM

## 2023-06-01 PROCEDURE — 99283 EMERGENCY DEPT VISIT LOW MDM: CPT

## 2023-06-01 RX ORDER — HYDROXYZINE HYDROCHLORIDE 25 MG/1
25 TABLET, FILM COATED ORAL 3 TIMES DAILY PRN
Qty: 15 TABLET | Refills: 0 | OUTPATIENT
Start: 2023-06-01 | End: 2023-07-14

## 2023-06-01 NOTE — ED PROVIDER NOTES
Encounter Date: 6/1/2023       History     Chief Complaint   Patient presents with    Skin Problem     Pt reports itching to right side of face/eye and also under left axilla that started last night. States wife currently has shingles.      Patient with pmhx of HTN presents today c/o focal areas of pruritus on body. He says his wife was recently dx with shingles and he is concerned her may have it. He denies any exacerbating or relieving factors. Denies pain or other associated symptoms. No recent environmental exposures.     The history is provided by the patient. No  was used.   Review of patient's allergies indicates:  No Known Allergies  Past Medical History:   Diagnosis Date    GERD (gastroesophageal reflux disease)     Hypertension      No past surgical history on file.  Family History   Problem Relation Age of Onset    Hypertension Mother     Hypertension Father     Aneurysm Father      Social History     Tobacco Use    Smoking status: Every Day     Years: 40.00     Types: Cigarettes    Smokeless tobacco: Never   Substance Use Topics    Alcohol use: Never    Drug use: Yes     Frequency: 7.0 times per week     Types: Marijuana     Review of Systems   Constitutional:  Negative for chills and fever.   Eyes:  Negative for pain.   Respiratory:  Negative for cough, chest tightness and shortness of breath.    Cardiovascular:  Negative for chest pain, palpitations and leg swelling.   Gastrointestinal:  Negative for abdominal pain, diarrhea, nausea and vomiting.   Genitourinary:  Negative for dysuria, flank pain and hematuria.   Musculoskeletal:  Negative for arthralgias and myalgias.   Skin:  Negative for rash.        Pruritus    Allergic/Immunologic: Negative for immunocompromised state.   Neurological:  Negative for syncope, light-headedness and headaches.     Physical Exam     Initial Vitals [06/01/23 0708]   BP Pulse Resp Temp SpO2   (!) 148/80 65 18 98.5 °F (36.9 °C) 98 %      MAP       --          Physical Exam    Nursing note and vitals reviewed.  Constitutional: He appears well-developed and well-nourished. He is not diaphoretic. No distress.   HENT:   Head: Normocephalic and atraumatic.   Mouth/Throat: Oropharynx is clear and moist. No oropharyngeal exudate.   Eyes: Conjunctivae and EOM are normal.   Neck: Neck supple.   Normal range of motion.  Cardiovascular:  Normal rate, regular rhythm, normal heart sounds and intact distal pulses.           Pulmonary/Chest: Breath sounds normal. No respiratory distress.   Abdominal: Abdomen is soft. He exhibits no distension. There is no abdominal tenderness. There is no rebound and no guarding.   Musculoskeletal:         General: No edema.      Cervical back: Normal range of motion and neck supple.     Neurological: He is alert and oriented to person, place, and time. He has normal strength. GCS score is 15. GCS eye subscore is 4. GCS verbal subscore is 5. GCS motor subscore is 6.   Skin: Skin is warm and dry. Capillary refill takes less than 2 seconds. No rash and no abscess noted. No erythema. No pallor.   Psychiatric: He has a normal mood and affect.       ED Course   Procedures  Labs Reviewed - No data to display       Imaging Results    None          Medications - No data to display  Medical Decision Making:   History:   Old Records Summarized: other records.  Initial Assessment:   Patient with focal areas of itching.  Differential Diagnosis:   Contact dermatitis, hives, insect bites  Recommend hydroxyzine prn. Advised to f/u with pcp. Ed precautions given.                         Clinical Impression:   Final diagnoses:  [L29.9] Itching (Primary)        ED Disposition Condition    Discharge Stable          ED Prescriptions       Medication Sig Dispense Start Date End Date Auth. Provider    hydrOXYzine HCL (ATARAX) 25 MG tablet Take 1 tablet (25 mg total) by mouth 3 (three) times daily as needed for Itching. 15 tablet 6/1/2023 -- STEVE Mendoza           Follow-up Information       Follow up With Specialties Details Why Contact Info    [facility] Emergency Medicine  If symptoms worsen return to ED immediately [address] [id number] [phone]    [facility] In 2 days  [address] [facility] [address]  [phone]               STEVE Mendoza  06/01/23 0753

## 2023-06-15 ENCOUNTER — HOSPITAL ENCOUNTER (EMERGENCY)
Facility: HOSPITAL | Age: 58
Discharge: HOME | End: 2023-06-15
Attending: STUDENT IN AN ORGANIZED HEALTH CARE EDUCATION/TRAINING PROGRAM
Payer: MEDICAID

## 2023-06-15 VITALS
HEIGHT: 71 IN | WEIGHT: 222 LBS | BODY MASS INDEX: 31.08 KG/M2 | HEART RATE: 80 BPM | TEMPERATURE: 98 F | RESPIRATION RATE: 20 BRPM | DIASTOLIC BLOOD PRESSURE: 87 MMHG | SYSTOLIC BLOOD PRESSURE: 147 MMHG | OXYGEN SATURATION: 100 %

## 2023-06-15 DIAGNOSIS — R31.9 HEMATURIA, UNSPECIFIED TYPE: ICD-10-CM

## 2023-06-15 DIAGNOSIS — N48.1 BALANITIS: Primary | ICD-10-CM

## 2023-06-15 DIAGNOSIS — R73.9 HYPERGLYCEMIA: ICD-10-CM

## 2023-06-15 LAB
ANION GAP SERPL CALC-SCNC: 11 MEQ/L
APPEARANCE UR: CLEAR
BACTERIA #/AREA URNS AUTO: ABNORMAL /HPF
BASOPHILS # BLD AUTO: 0.03 X10(3)/MCL
BASOPHILS NFR BLD AUTO: 0.5 %
BILIRUB UR QL STRIP.AUTO: NEGATIVE MG/DL
BUN SERPL-MCNC: 12.8 MG/DL (ref 8.4–25.7)
C TRACH DNA SPEC QL NAA+PROBE: NOT DETECTED
CALCIUM SERPL-MCNC: 9.9 MG/DL (ref 8.4–10.2)
CHLORIDE SERPL-SCNC: 99 MMOL/L (ref 98–107)
CO2 SERPL-SCNC: 26 MMOL/L (ref 22–29)
COLOR UR: ABNORMAL
CREAT SERPL-MCNC: 1.29 MG/DL (ref 0.73–1.18)
CREAT/UREA NIT SERPL: 10
EOSINOPHIL # BLD AUTO: 0.05 X10(3)/MCL (ref 0–0.9)
EOSINOPHIL NFR BLD AUTO: 0.8 %
ERYTHROCYTE [DISTWIDTH] IN BLOOD BY AUTOMATED COUNT: 12 % (ref 11.5–17)
EST. AVERAGE GLUCOSE BLD GHB EST-MCNC: 294.8 MG/DL
GFR SERPLBLD CREATININE-BSD FMLA CKD-EPI: >60 MLS/MIN/1.73/M2
GLUCOSE SERPL-MCNC: 325 MG/DL (ref 70–110)
GLUCOSE SERPL-MCNC: 443 MG/DL (ref 74–100)
GLUCOSE UR QL STRIP.AUTO: ABNORMAL MG/DL
HBA1C MFR BLD: 11.9 %
HCT VFR BLD AUTO: 44.2 % (ref 42–52)
HGB BLD-MCNC: 15.5 G/DL (ref 14–18)
HYALINE CASTS #/AREA URNS LPF: ABNORMAL /LPF
IMM GRANULOCYTES # BLD AUTO: 0.01 X10(3)/MCL (ref 0–0.04)
IMM GRANULOCYTES NFR BLD AUTO: 0.2 %
KETONES UR QL STRIP.AUTO: NEGATIVE MG/DL
LEUKOCYTE ESTERASE UR QL STRIP.AUTO: 250 UNIT/L
LYMPHOCYTES # BLD AUTO: 2.07 X10(3)/MCL (ref 0.6–4.6)
LYMPHOCYTES NFR BLD AUTO: 33.2 %
MCH RBC QN AUTO: 29.4 PG (ref 27–31)
MCHC RBC AUTO-ENTMCNC: 35.1 G/DL (ref 33–36)
MCV RBC AUTO: 83.7 FL (ref 80–94)
MONOCYTES # BLD AUTO: 0.38 X10(3)/MCL (ref 0.1–1.3)
MONOCYTES NFR BLD AUTO: 6.1 %
MUCOUS THREADS URNS QL MICRO: ABNORMAL /LPF
N GONORRHOEA DNA SPEC QL NAA+PROBE: NOT DETECTED
NEUTROPHILS # BLD AUTO: 3.7 X10(3)/MCL (ref 2.1–9.2)
NEUTROPHILS NFR BLD AUTO: 59.2 %
NITRITE UR QL STRIP.AUTO: NEGATIVE
NRBC BLD AUTO-RTO: 0 %
PH UR STRIP.AUTO: 5.5 [PH]
PLATELET # BLD AUTO: 240 X10(3)/MCL (ref 130–400)
PMV BLD AUTO: 10.8 FL (ref 7.4–10.4)
POCT GLUCOSE: 250 MG/DL (ref 70–110)
POCT GLUCOSE: 325 MG/DL (ref 70–110)
POCT GLUCOSE: 425 MG/DL (ref 70–110)
POTASSIUM SERPL-SCNC: 4.4 MMOL/L (ref 3.5–5.1)
PROT UR QL STRIP.AUTO: ABNORMAL MG/DL
RBC # BLD AUTO: 5.28 X10(6)/MCL (ref 4.7–6.1)
RBC #/AREA URNS AUTO: ABNORMAL /HPF
RBC UR QL AUTO: ABNORMAL UNIT/L
SODIUM SERPL-SCNC: 136 MMOL/L (ref 136–145)
SP GR UR STRIP.AUTO: 1.04
SQUAMOUS #/AREA URNS LPF: ABNORMAL /HPF
UROBILINOGEN UR STRIP-ACNC: NORMAL MG/DL
WBC # SPEC AUTO: 6.24 X10(3)/MCL (ref 4.5–11.5)
WBC #/AREA URNS AUTO: ABNORMAL /HPF

## 2023-06-15 PROCEDURE — 85025 COMPLETE CBC W/AUTO DIFF WBC: CPT | Performed by: PHYSICIAN ASSISTANT

## 2023-06-15 PROCEDURE — 96361 HYDRATE IV INFUSION ADD-ON: CPT

## 2023-06-15 PROCEDURE — 80048 BASIC METABOLIC PNL TOTAL CA: CPT | Performed by: PHYSICIAN ASSISTANT

## 2023-06-15 PROCEDURE — 63600175 PHARM REV CODE 636 W HCPCS: Performed by: PHYSICIAN ASSISTANT

## 2023-06-15 PROCEDURE — 96374 THER/PROPH/DIAG INJ IV PUSH: CPT

## 2023-06-15 PROCEDURE — 87591 N.GONORRHOEAE DNA AMP PROB: CPT | Performed by: PHYSICIAN ASSISTANT

## 2023-06-15 PROCEDURE — 83036 HEMOGLOBIN GLYCOSYLATED A1C: CPT | Performed by: PHYSICIAN ASSISTANT

## 2023-06-15 PROCEDURE — 82962 GLUCOSE BLOOD TEST: CPT

## 2023-06-15 PROCEDURE — 81001 URINALYSIS AUTO W/SCOPE: CPT | Performed by: PHYSICIAN ASSISTANT

## 2023-06-15 PROCEDURE — 99284 EMERGENCY DEPT VISIT MOD MDM: CPT | Mod: 25

## 2023-06-15 PROCEDURE — 25000003 PHARM REV CODE 250: Performed by: PHYSICIAN ASSISTANT

## 2023-06-15 RX ORDER — CLOTRIMAZOLE 1 %
CREAM (GRAM) TOPICAL 2 TIMES DAILY
Qty: 45 G | Refills: 0 | Status: SHIPPED | OUTPATIENT
Start: 2023-06-15 | End: 2024-03-14

## 2023-06-15 RX ORDER — METRONIDAZOLE 500 MG/1
500 TABLET ORAL EVERY 12 HOURS
Qty: 14 TABLET | Refills: 0 | Status: SHIPPED | OUTPATIENT
Start: 2023-06-15 | End: 2023-06-22

## 2023-06-15 RX ADMIN — SODIUM CHLORIDE 1000 ML: 9 INJECTION, SOLUTION INTRAVENOUS at 10:06

## 2023-06-15 RX ADMIN — HUMAN INSULIN 5 UNITS: 100 INJECTION, SOLUTION SUBCUTANEOUS at 01:06

## 2023-06-15 NOTE — ED PROVIDER NOTES
"Encounter Date: 6/15/2023       History     Chief Complaint   Patient presents with    Groin Swelling     To the ED with concerns of "a private part cold". Patient denies discharge and dysuria, but states "there is brown stuff that I have to clean off on the outside of my penis and the tip of my penis is like swollen but I'm able to pull back the foreskin". Onset approx 2 weeks ago.      Patient reports to the ER with complaints of discharge from the tip of his penis with assoc pain pulling back the foreskin for the past x2 weeks; pt denies any urethral discharge, but does admit to having unprotected sex approx x2 weeks ago    The history is provided by the patient.   Male  Problem  Primary symptoms include genital itching.   Primary symptoms include no dysuria. This is a new problem. The current episode started several weeks ago. The problem occurs throughout the day. The problem has been unchanged. Pertinent negatives include no diaphoresis, no nausea, no vomiting, no abdominal pain, no abdominal swelling, no frequency and no diarrhea. He inconsistently uses condoms.   Review of patient's allergies indicates:  No Known Allergies  Past Medical History:   Diagnosis Date    GERD (gastroesophageal reflux disease)     Hypertension      History reviewed. No pertinent surgical history.  Family History   Problem Relation Age of Onset    Hypertension Mother     Hypertension Father     Aneurysm Father      Social History     Tobacco Use    Smoking status: Former     Years: 40.00     Types: Cigarettes    Smokeless tobacco: Never   Substance Use Topics    Alcohol use: Never    Drug use: Yes     Frequency: 7.0 times per week     Types: Marijuana     Review of Systems   Constitutional:  Negative for diaphoresis and fever.   HENT:  Negative for sore throat.    Respiratory:  Negative for shortness of breath.    Cardiovascular:  Negative for chest pain.   Gastrointestinal:  Negative for abdominal pain, diarrhea, nausea and " vomiting.   Genitourinary:  Negative for dysuria and frequency.   Musculoskeletal:  Negative for back pain.   Skin:  Negative for rash.   Neurological:  Negative for weakness.   Hematological:  Does not bruise/bleed easily.   Psychiatric/Behavioral: Negative.       Physical Exam     Initial Vitals [06/15/23 0913]   BP Pulse Resp Temp SpO2   (!) 167/92 97 17 98.1 °F (36.7 °C) 99 %      MAP       --         Physical Exam    Vitals reviewed.  Constitutional: He appears well-developed.   HENT:   Head: Normocephalic and atraumatic.   Eyes: Conjunctivae and EOM are normal. Pupils are equal, round, and reactive to light.   Neck:   Normal range of motion.  Cardiovascular:  Normal rate, regular rhythm, normal heart sounds and intact distal pulses.           Pulmonary/Chest: Breath sounds normal. He exhibits no tenderness.   Abdominal: Abdomen is soft. Bowel sounds are normal. He exhibits no distension. There is no abdominal tenderness.   Genitourinary:    Testes and penis normal.   Uncircumcised. No penile erythema.    Genitourinary Comments: Foreskin is able to be retracted fully with mild discomfort; evidence of balanitis when retracted     Musculoskeletal:         General: Normal range of motion.      Cervical back: Normal range of motion.     Neurological: He is alert and oriented to person, place, and time. He displays normal reflexes. No cranial nerve deficit or sensory deficit. GCS score is 15. GCS eye subscore is 4. GCS verbal subscore is 5. GCS motor subscore is 6.   Skin: Skin is warm. No pallor.   Psychiatric: He has a normal mood and affect. His behavior is normal. Judgment and thought content normal.       ED Course   Procedures  Labs Reviewed   URINALYSIS, REFLEX TO URINE CULTURE - Abnormal; Notable for the following components:       Result Value    Protein, UA 1+ (*)     Glucose, UA 4+ (*)     Blood, UA 3+ (*)     Leukocyte Esterase,  (*)     WBC, UA 6-10 (*)     Squamous Epithelial Cells, UA Occ (*)      Mucous, UA Trace (*)     RBC, UA 11-20 (*)     All other components within normal limits   BASIC METABOLIC PANEL - Abnormal; Notable for the following components:    Glucose Level 443 (*)     Creatinine 1.29 (*)     All other components within normal limits   HEMOGLOBIN A1C - Abnormal; Notable for the following components:    Hemoglobin A1c 11.9 (*)     All other components within normal limits   CBC WITH DIFFERENTIAL - Abnormal; Notable for the following components:    MPV 10.8 (*)     All other components within normal limits   POCT GLUCOSE, HAND-HELD DEVICE - Abnormal; Notable for the following components:    POC Glucose 325 (*)     All other components within normal limits   POCT GLUCOSE - Abnormal; Notable for the following components:    POCT Glucose 425 (*)     All other components within normal limits   POCT GLUCOSE - Abnormal; Notable for the following components:    POCT Glucose 325 (*)     All other components within normal limits   POCT GLUCOSE - Abnormal; Notable for the following components:    POCT Glucose 250 (*)     All other components within normal limits   CHLAMYDIA/GONORRHOEAE(GC), PCR - Normal    Narrative:     The Xpert CT/NG test, performed on the Microstaq system is a qualitative in vitro real-time polymerase chain reaction (PCR) test for the automated detected and differentiation for genomic DNA from Chlamydia trachomatis (CT) and/or Neisseria gonorrhoeae (NG).   CBC W/ AUTO DIFFERENTIAL    Narrative:     The following orders were created for panel order CBC auto differential.  Procedure                               Abnormality         Status                     ---------                               -----------         ------                     CBC with Differential[[id number]]        Abnormal            Final result                 Please view results for these tests on the individual orders.   EXTRA TUBES    Narrative:     The following orders were created for panel order EXTRA  TUBES.  Procedure                               Abnormality         Status                     ---------                               -----------         ------                     Light Blue Top Hold[[id number]]                              In process                 Gold Top Hold[[id number]]                                    In process                   Please view results for these tests on the individual orders.   LIGHT BLUE TOP HOLD   GOLD TOP HOLD   POCT GLUCOSE, HAND-HELD DEVICE          Imaging Results    None          Medications   sodium chloride 0.9% bolus 1,000 mL 1,000 mL (0 mLs Intravenous Stopped 6/15/23 1130)   insulin regular injection 5 Units 0.05 mL (5 Units Intravenous Given 6/15/23 1317)     Medical Decision Making:   Differential Diagnosis:   Std vs balanitis vs uti  ED Management:  Due to balanitis a CBG was ordered; with level above 400, more labs will now be added along with IV fluids with goal of CBG under 250; pt will have referral for the end of the month with his pcp    Given strict ED return precautions. I have spoken with the patient and/or caregivers. I have explained the patient's condition, diagnoses and treatment plan based on the information available to me at this time. I have answered the patient's and/or caregiver's questions and addressed any concerns. The patient and/or caregivers have as good an understanding of the patient's diagnosis, condition and treatment plan as can be expected at this point. The vital signs have been stable. The patient's condition is stable and appropriate for discharge from the emergency department.      The patient will pursue further outpatient evaluation with the primary care physician or other designated or consulting physician as outlined in the discharge instructions. The patient and/or caregivers are agreeable to this plan of care and follow-up instructions have been explained in detail. The patient and/or caregivers have received these  instructions in written format and have expressed an understanding of the discharge instructions. The patient and/or caregivers are aware that any significant change in condition or worsening of symptoms should prompt an immediate return to this or the closest emergency department or a call to 911.       APC / Resident Notes:   I was not physically present during the history or exam of this patient. I was available at all times for consultation. (Hollis)                   Clinical Impression:   Final diagnoses:  [N48.1] Balanitis (Primary)  [R73.9] Hyperglycemia  [R31.9] Hematuria, unspecified type        ED Disposition Condition    Discharge Stable          ED Prescriptions       Medication Sig Dispense Start Date End Date Auth. Provider    metroNIDAZOLE (FLAGYL) 500 MG tablet Take 1 tablet (500 mg total) by mouth every 12 (twelve) hours. for 7 days 14 tablet 6/15/2023 6/22/2023 STEVE Shahid    clotrimazole (LOTRIMIN) 1 % cream Apply topically 2 (two) times daily. for 7 days 45 g 6/15/2023 6/22/2023 STEVE Shahid          Follow-up Information       Follow up With Specialties Details Why Contact Info    discharge followup    If your symptoms become WORSE or you DO NOT IMPROVE and you are unable to reach your health care provider, you should RETURN to the emergency department    Ruth Dunlap, [facility]   [facility] [place] [address]  [phone]      discharge info    Discussed all pertinent ED information, results, diagnosis and treatment plan; All questions and concerns were addressed at this time. Patient voices understanding of information and instructions. Patient is comfortable with plan and discharge             STEVE Shahid  06/15/23 8098       Srinivas Shafer MD  06/15/23 4483

## 2023-06-19 ENCOUNTER — HOSPITAL ENCOUNTER (EMERGENCY)
Facility: HOSPITAL | Age: 58
Discharge: HOME | End: 2023-06-19
Attending: STUDENT IN AN ORGANIZED HEALTH CARE EDUCATION/TRAINING PROGRAM
Payer: MEDICAID

## 2023-06-19 VITALS
BODY MASS INDEX: 29.51 KG/M2 | RESPIRATION RATE: 16 BRPM | OXYGEN SATURATION: 99 % | TEMPERATURE: 99 F | WEIGHT: 222.69 LBS | SYSTOLIC BLOOD PRESSURE: 137 MMHG | HEART RATE: 73 BPM | DIASTOLIC BLOOD PRESSURE: 82 MMHG | HEIGHT: 73 IN

## 2023-06-19 DIAGNOSIS — N28.1 KIDNEY CYSTS: ICD-10-CM

## 2023-06-19 DIAGNOSIS — R73.9 HYPERGLYCEMIA: Primary | ICD-10-CM

## 2023-06-19 DIAGNOSIS — K92.1 BLOOD IN STOOL: ICD-10-CM

## 2023-06-19 LAB
ALBUMIN SERPL-MCNC: 4.3 G/DL (ref 3.5–5)
ALBUMIN/GLOB SERPL: 1.4 RATIO (ref 1.1–2)
ALP SERPL-CCNC: 76 UNIT/L (ref 40–150)
ALT SERPL-CCNC: 26 UNIT/L (ref 0–55)
APPEARANCE UR: CLEAR
AST SERPL-CCNC: 20 UNIT/L (ref 5–34)
BACTERIA #/AREA URNS AUTO: ABNORMAL /HPF
BASOPHILS # BLD AUTO: 0.04 X10(3)/MCL
BASOPHILS NFR BLD AUTO: 0.6 %
BILIRUB UR QL STRIP.AUTO: NEGATIVE MG/DL
BILIRUBIN DIRECT+TOT PNL SERPL-MCNC: 0.6 MG/DL
BUN SERPL-MCNC: 12 MG/DL (ref 8.4–25.7)
CALCIUM SERPL-MCNC: 10.5 MG/DL (ref 8.4–10.2)
CHLORIDE SERPL-SCNC: 101 MMOL/L (ref 98–107)
CO2 SERPL-SCNC: 27 MMOL/L (ref 22–29)
COLOR UR: COLORLESS
CREAT SERPL-MCNC: 0.99 MG/DL (ref 0.73–1.18)
EOSINOPHIL # BLD AUTO: 0.04 X10(3)/MCL (ref 0–0.9)
EOSINOPHIL NFR BLD AUTO: 0.6 %
ERYTHROCYTE [DISTWIDTH] IN BLOOD BY AUTOMATED COUNT: 11.9 % (ref 11.5–17)
GFR SERPLBLD CREATININE-BSD FMLA CKD-EPI: >60 MLS/MIN/1.73/M2
GLOBULIN SER-MCNC: 3 GM/DL (ref 2.4–3.5)
GLUCOSE SERPL-MCNC: 219 MG/DL (ref 70–110)
GLUCOSE SERPL-MCNC: 284 MG/DL (ref 74–100)
GLUCOSE UR QL STRIP.AUTO: ABNORMAL MG/DL
HCT VFR BLD AUTO: 44.5 % (ref 42–52)
HGB BLD-MCNC: 15.3 G/DL (ref 14–18)
HYALINE CASTS #/AREA URNS LPF: ABNORMAL /LPF
IMM GRANULOCYTES # BLD AUTO: 0.01 X10(3)/MCL (ref 0–0.04)
IMM GRANULOCYTES NFR BLD AUTO: 0.1 %
KETONES UR QL STRIP.AUTO: NEGATIVE MG/DL
LEUKOCYTE ESTERASE UR QL STRIP.AUTO: NEGATIVE UNIT/L
LYMPHOCYTES # BLD AUTO: 3.4 X10(3)/MCL (ref 0.6–4.6)
LYMPHOCYTES NFR BLD AUTO: 47.4 %
MCH RBC QN AUTO: 29.1 PG (ref 27–31)
MCHC RBC AUTO-ENTMCNC: 34.4 G/DL (ref 33–36)
MCV RBC AUTO: 84.8 FL (ref 80–94)
MONOCYTES # BLD AUTO: 0.41 X10(3)/MCL (ref 0.1–1.3)
MONOCYTES NFR BLD AUTO: 5.7 %
MUCOUS THREADS URNS QL MICRO: ABNORMAL /LPF
NEUTROPHILS # BLD AUTO: 3.27 X10(3)/MCL (ref 2.1–9.2)
NEUTROPHILS NFR BLD AUTO: 45.6 %
NITRITE UR QL STRIP.AUTO: NEGATIVE
NRBC BLD AUTO-RTO: 0 %
PH UR STRIP.AUTO: 5.5 [PH]
PLATELET # BLD AUTO: 283 X10(3)/MCL (ref 130–400)
PMV BLD AUTO: 10.9 FL (ref 7.4–10.4)
POCT GLUCOSE: 219 MG/DL (ref 70–110)
POCT GLUCOSE: 252 MG/DL (ref 70–110)
POCT GLUCOSE: 285 MG/DL (ref 70–110)
POTASSIUM SERPL-SCNC: 4.1 MMOL/L (ref 3.5–5.1)
PROT SERPL-MCNC: 7.3 GM/DL (ref 6.4–8.3)
PROT UR QL STRIP.AUTO: NEGATIVE MG/DL
RBC # BLD AUTO: 5.25 X10(6)/MCL (ref 4.7–6.1)
RBC #/AREA URNS AUTO: ABNORMAL /HPF
RBC UR QL AUTO: ABNORMAL UNIT/L
SODIUM SERPL-SCNC: 138 MMOL/L (ref 136–145)
SP GR UR STRIP.AUTO: 1.04
SQUAMOUS #/AREA URNS LPF: ABNORMAL /HPF
UROBILINOGEN UR STRIP-ACNC: NORMAL MG/DL
WBC # SPEC AUTO: 7.17 X10(3)/MCL (ref 4.5–11.5)
WBC #/AREA URNS AUTO: ABNORMAL /HPF

## 2023-06-19 PROCEDURE — 63600175 PHARM REV CODE 636 W HCPCS: Performed by: PHYSICIAN ASSISTANT

## 2023-06-19 PROCEDURE — 80053 COMPREHEN METABOLIC PANEL: CPT | Performed by: PHYSICIAN ASSISTANT

## 2023-06-19 PROCEDURE — 96361 HYDRATE IV INFUSION ADD-ON: CPT

## 2023-06-19 PROCEDURE — 82962 GLUCOSE BLOOD TEST: CPT

## 2023-06-19 PROCEDURE — 96360 HYDRATION IV INFUSION INIT: CPT

## 2023-06-19 PROCEDURE — 85025 COMPLETE CBC W/AUTO DIFF WBC: CPT | Performed by: PHYSICIAN ASSISTANT

## 2023-06-19 PROCEDURE — 25500020 PHARM REV CODE 255

## 2023-06-19 PROCEDURE — 81001 URINALYSIS AUTO W/SCOPE: CPT | Performed by: PHYSICIAN ASSISTANT

## 2023-06-19 PROCEDURE — 99285 EMERGENCY DEPT VISIT HI MDM: CPT | Mod: 25

## 2023-06-19 RX ORDER — INSULIN PUMP SYRINGE, 3 ML
EACH MISCELLANEOUS
Qty: 1 EACH | Refills: 0 | Status: SHIPPED | OUTPATIENT
Start: 2023-06-19 | End: 2024-06-18

## 2023-06-19 RX ADMIN — SODIUM CHLORIDE, POTASSIUM CHLORIDE, SODIUM LACTATE AND CALCIUM CHLORIDE 1000 ML: 600; 310; 30; 20 INJECTION, SOLUTION INTRAVENOUS at 12:06

## 2023-06-19 RX ADMIN — SODIUM CHLORIDE, POTASSIUM CHLORIDE, SODIUM LACTATE AND CALCIUM CHLORIDE 1000 ML: 600; 310; 30; 20 INJECTION, SOLUTION INTRAVENOUS at 11:06

## 2023-06-19 RX ADMIN — IOHEXOL 100 ML: 350 INJECTION, SOLUTION INTRAVENOUS at 01:06

## 2023-06-19 NOTE — ED PROVIDER NOTES
"Encounter Date: 6/19/2023       History     Chief Complaint   Patient presents with    Hyperglycemia     Pt home glucometer not working. States "I feel like my blood sugar is high" d/t "neck pain and eyes heavy".  in triage; compliant with home DM meds.      57 year male with a history of hypertension, GERD and diabetes, presents to the emergency department stating  "I feel like my blood sugar is high" and reports bilateral neck pain and "heavy eyes".  CBG to 85 in triage.  He states he is compliant with his home medications.  He also reports bloody stool and hematuria x years however has not seen a specialist.  He denies fever, chills, nausea, vomiting, chest pain, abdominal pain, dysuria, headache, vision changes, dizziness.    The history is provided by the patient. No  was used.   Review of patient's allergies indicates:  No Known Allergies  Past Medical History:   Diagnosis Date    GERD (gastroesophageal reflux disease)     Hypertension      History reviewed. No pertinent surgical history.  Family History   Problem Relation Age of Onset    Hypertension Mother     Hypertension Father     Aneurysm Father      Social History     Tobacco Use    Smoking status: Former     Years: 40.00     Types: Cigarettes    Smokeless tobacco: Never   Substance Use Topics    Alcohol use: Never    Drug use: Yes     Frequency: 7.0 times per week     Types: Marijuana     Review of Systems   Constitutional:  Negative for chills and fever.   Eyes: Negative.    Respiratory:  Negative for cough and shortness of breath.    Cardiovascular:  Negative for chest pain and palpitations.   Gastrointestinal:  Positive for blood in stool. Negative for abdominal pain, nausea and vomiting.   Genitourinary:  Positive for hematuria. Negative for dysuria and flank pain.   Musculoskeletal:  Negative for back pain and neck pain.        Bi neck pain     Skin:  Negative for color change and rash.   Neurological:  Negative for " dizziness, light-headedness and headaches.     Physical Exam     Initial Vitals   BP Pulse Resp Temp SpO2   06/19/23 1033 06/19/23 1033 06/19/23 1030 06/19/23 1030 06/19/23 1030   (!) 141/81 75 18 98.6 °F (37 °C) 99 %      MAP       --                Physical Exam    Nursing note and vitals reviewed.  Constitutional: He appears well-developed and well-nourished.   HENT:   Nose: Nose normal.   Mouth/Throat: Oropharynx is clear and moist.   Eyes: Conjunctivae are normal.   Neck: Neck supple.   Normal range of motion.  Cardiovascular:  Normal rate, regular rhythm, normal heart sounds and intact distal pulses.           Pulmonary/Chest: Breath sounds normal. No respiratory distress. He has no wheezes.   Abdominal: Abdomen is soft. Bowel sounds are normal. There is no abdominal tenderness. There is no rebound and no guarding.   Genitourinary:    Genitourinary Comments: Patient declined rectal exam     Musculoskeletal:         General: Normal range of motion.      Cervical back: Normal range of motion and neck supple.     Lymphadenopathy:     He has no cervical adenopathy.   Neurological: He is alert. GCS eye subscore is 4. GCS verbal subscore is 5. GCS motor subscore is 6.   Skin: Skin is warm. Capillary refill takes less than 2 seconds.       ED Course   Procedures  Labs Reviewed   COMPREHENSIVE METABOLIC PANEL - Abnormal; Notable for the following components:       Result Value    Glucose Level 284 (*)     Calcium Level Total 10.5 (*)     All other components within normal limits   CBC WITH DIFFERENTIAL - Abnormal; Notable for the following components:    MPV 10.9 (*)     All other components within normal limits   URINALYSIS, REFLEX TO URINE CULTURE - Abnormal; Notable for the following components:    Color, UA Colorless (*)     Glucose, UA 3+ (*)     Blood, UA 2+ (*)     Squamous Epithelial Cells, UA Trace (*)     Mucous, UA Trace (*)     RBC, UA 6-10 (*)     All other components within normal limits   POCT GLUCOSE,  HAND-HELD DEVICE - Abnormal; Notable for the following components:    POC Glucose 219 (*)     All other components within normal limits   POCT GLUCOSE - Abnormal; Notable for the following components:    POCT Glucose 285 (*)     All other components within normal limits   POCT GLUCOSE - Abnormal; Notable for the following components:    POCT Glucose 252 (*)     All other components within normal limits   POCT GLUCOSE - Abnormal; Notable for the following components:    POCT Glucose 219 (*)     All other components within normal limits   CBC W/ AUTO DIFFERENTIAL    Narrative:     The following orders were created for panel order CBC Auto Differential.  Procedure                               Abnormality         Status                     ---------                               -----------         ------                     CBC with Differential[[id number]]        Abnormal            Final result                 Please view results for these tests on the individual orders.   EXTRA TUBES    Narrative:     The following orders were created for panel order EXTRA TUBES.  Procedure                               Abnormality         Status                     ---------                               -----------         ------                     Lavender Top Hold[[id number]]                                In process                   Please view results for these tests on the individual orders.   LAVENDER TOP HOLD          Imaging Results              CT Abdomen Pelvis With Contrast (Final result)  Result time 06/19/23 13:29:45      Final result by Anna Brush MD (06/19/23 13:29:45)                   Impression:      Multiple cysts in the kidneys stable since prior examination    Otherwise unremarkable      Electronically signed by: Anna Brush  Date:    06/19/2023  Time:    13:29               Narrative:    EXAMINATION:  CT ABDOMEN PELVIS WITH CONTRAST    CLINICAL HISTORY:  rectal bleeding,  "hematuria;    TECHNIQUE:  Low dose axial images, sagittal and coronal reformations were obtained from the lung bases to the pubic symphysis following the IV administration of contrast. Automatic exposure control (AEC) is utilized to reduce patient radiation exposure.    COMPARISON:  02/17/2017    FINDINGS:  The lung bases are clear.    The liver appears normal.  No liver mass or lesion is seen.  Portal and hepatic veins appear normal.    The gallbladder appears normal.  No obvious gallstones are seen.  No biliary dilatation is seen.  No pericholecystic fluid is seen.    The pancreas appears normal.  No pancreatic mass or lesion is seen.    The spleen shows no acute abnormality.    The adrenal glands appear normal.  No adrenal nodule is seen.    There are multiple cysts seen in both kidneys which are stable since the prior examination..  No hydronephrosis is seen.  No hydroureter is seen.  No nephrolithiasis is seen.  No obvious ureteral stones are seen.    Urinary bladder appears grossly unremarkable.    No colitis is seen.  No diverticulitis is seen.  No obvious colonic mass or lesion is seen.    No free air is seen.  No free fluid is seen.                                       Medications   lactated ringers bolus 1,000 mL (0 mLs Intravenous Stopped 6/19/23 1234)   lactated ringers bolus 1,000 mL (0 mLs Intravenous Stopped 6/19/23 1331)   iohexoL (OMNIPAQUE 350) 350 mg iodine/mL injection (100 mLs  Given 6/19/23 1315)     Medical Decision Making:   Initial Assessment:   57 year male with a history of hypertension, GERD and diabetes, presents to the emergency department stating  "I feel like my blood sugar is high" and reports bilateral neck pain and "heavy eyes".    Differential Diagnosis:   Hyperglycemia  Hemorrhoids  Inflammatory bowel disease  Kidney stones    Clinical Tests:   Lab Tests: Reviewed and Ordered       <> Summary of Lab: Glucose 284    Urinalysis, RBCs 6-10      Radiological Study: Ordered and " Reviewed  ED Management:  Medications given:  Lactated Ringer's 2 L    CT abdomen and pelvis with contrast: Multiple cysts in the kidneys stable since prior examination.  Otherwise unremarkable    Final CB    Patient states he's had a bloody stools and hematuria for years.  Referral sent to GI Urology  The patient is resting comfortably and in no acute distress.  He states that his symptoms have improved after treatment in Emergency Department. I personally discussed his test results and treatment plan.  Gave strict ED precautions and specific conditions for return to the emergency department and importance of follow up with pcp.  Patient voices understanding and agrees to the plan discussed. All patients' questions have been answered at this time. He has remained hemodynamically stable throughout entire stay in ED and is stable for discharge home.      APC / Resident Notes:   I was not physically present during the history, exam or disposition of this patient. I was available at all times for consultation. (Hollis)      ED Course as of 23 0223      1153 POCT Glucose(!): 285 [ER]   1247 Glucose(!): 284 [ER]   1333 CT Abdomen Pelvis With Contrast  Multiple cysts in the kidneys stable since prior examination     Otherwise unremarkable [ER]   1411 RBC, UA(!): 6-10 [ER]   1411 POC Glucose(!): 219 [ER]      0918 Hemoglobin: 15.3 [ER]   0919 Hematocrit: 44.5 [ER]      ED Course User Index  [ER] STEVE Mayorga                 Clinical Impression:   Final diagnoses:  [R73.9] Hyperglycemia (Primary)  [K92.1] Blood in stool  [N28.1] Kidney cysts        ED Disposition Condition    Discharge Stable          ED Prescriptions       Medication Sig Dispense Start Date End Date Auth. Provider    blood-glucose meter kit Check glucose 3x a day with meals 1 each 2023 STEVE Mayorga          Follow-up Information       Follow up With Specialties Details Why Contact Info  Additional Information    [facility] G[facility]ology  they will call to schedule apt [address] [facility] [id number] [phone] Entrance 1    [facility] Urology  They will call you to schedule an apt. [address] [facility] [id number] [phone]     [facility] Emergency [facility] Emergency Medicine  As needed, If symptoms worsen [address] [facility] [id number] [phone]              STEVE Mayorga  06/20/23 0919       Srinivas Shafer MD  06/21/23 0222

## 2023-06-19 NOTE — DISCHARGE INSTRUCTIONS
Take medication as prescribed for diabetes.  Follow diabetic diet.   Check glucose with meals.  Referral sent to GI clinic and Urology clinic.   Follow up with your primary care provider within 2-3 days.

## 2023-06-21 ENCOUNTER — HOSPITAL ENCOUNTER (EMERGENCY)
Facility: HOSPITAL | Age: 58
Discharge: HOME | End: 2023-06-21
Attending: INTERNAL MEDICINE
Payer: MEDICAID

## 2023-06-21 VITALS
OXYGEN SATURATION: 100 % | DIASTOLIC BLOOD PRESSURE: 80 MMHG | WEIGHT: 222.69 LBS | HEART RATE: 68 BPM | HEIGHT: 73 IN | BODY MASS INDEX: 29.51 KG/M2 | SYSTOLIC BLOOD PRESSURE: 140 MMHG | TEMPERATURE: 98 F | RESPIRATION RATE: 16 BRPM

## 2023-06-21 DIAGNOSIS — R07.89 ATYPICAL CHEST PAIN: ICD-10-CM

## 2023-06-21 DIAGNOSIS — R07.9 CHEST PAIN: ICD-10-CM

## 2023-06-21 DIAGNOSIS — E11.69 TYPE 2 DIABETES MELLITUS WITH OTHER SPECIFIED COMPLICATION, WITHOUT LONG-TERM CURRENT USE OF INSULIN: Primary | ICD-10-CM

## 2023-06-21 LAB
ANION GAP SERPL CALC-SCNC: 9 MEQ/L
APPEARANCE UR: CLEAR
BACTERIA #/AREA URNS AUTO: ABNORMAL /HPF
BASOPHILS # BLD AUTO: 0.03 X10(3)/MCL
BASOPHILS NFR BLD AUTO: 0.5 %
BILIRUB UR QL STRIP.AUTO: NEGATIVE MG/DL
BUN SERPL-MCNC: 11.1 MG/DL (ref 8.4–25.7)
CALCIUM SERPL-MCNC: 10.1 MG/DL (ref 8.4–10.2)
CHLORIDE SERPL-SCNC: 103 MMOL/L (ref 98–107)
CO2 SERPL-SCNC: 24 MMOL/L (ref 22–29)
COLOR UR: COLORLESS
CREAT SERPL-MCNC: 1.01 MG/DL (ref 0.73–1.18)
CREAT/UREA NIT SERPL: 11
EOSINOPHIL # BLD AUTO: 0.03 X10(3)/MCL (ref 0–0.9)
EOSINOPHIL NFR BLD AUTO: 0.5 %
ERYTHROCYTE [DISTWIDTH] IN BLOOD BY AUTOMATED COUNT: 12.1 % (ref 11.5–17)
GFR SERPLBLD CREATININE-BSD FMLA CKD-EPI: >60 MLS/MIN/1.73/M2
GLUCOSE SERPL-MCNC: 268 MG/DL (ref 74–100)
GLUCOSE UR QL STRIP.AUTO: ABNORMAL MG/DL
HCT VFR BLD AUTO: 41.8 % (ref 42–52)
HGB BLD-MCNC: 14.5 G/DL (ref 14–18)
HYALINE CASTS #/AREA URNS LPF: ABNORMAL /LPF
IMM GRANULOCYTES # BLD AUTO: 0.02 X10(3)/MCL (ref 0–0.04)
IMM GRANULOCYTES NFR BLD AUTO: 0.3 %
KETONES UR QL STRIP.AUTO: NEGATIVE MG/DL
LEUKOCYTE ESTERASE UR QL STRIP.AUTO: NEGATIVE UNIT/L
LYMPHOCYTES # BLD AUTO: 2.67 X10(3)/MCL (ref 0.6–4.6)
LYMPHOCYTES NFR BLD AUTO: 40.9 %
MCH RBC QN AUTO: 29.4 PG (ref 27–31)
MCHC RBC AUTO-ENTMCNC: 34.7 G/DL (ref 33–36)
MCV RBC AUTO: 84.6 FL (ref 80–94)
MONOCYTES # BLD AUTO: 0.34 X10(3)/MCL (ref 0.1–1.3)
MONOCYTES NFR BLD AUTO: 5.2 %
NEUTROPHILS # BLD AUTO: 3.44 X10(3)/MCL (ref 2.1–9.2)
NEUTROPHILS NFR BLD AUTO: 52.6 %
NITRITE UR QL STRIP.AUTO: NEGATIVE
NRBC BLD AUTO-RTO: 0 %
PH UR STRIP.AUTO: 6 [PH]
PLATELET # BLD AUTO: 268 X10(3)/MCL (ref 130–400)
PMV BLD AUTO: 10.7 FL (ref 7.4–10.4)
POCT GLUCOSE: 213 MG/DL (ref 70–110)
POCT GLUCOSE: 255 MG/DL (ref 70–110)
POTASSIUM SERPL-SCNC: 4.1 MMOL/L (ref 3.5–5.1)
PROT UR QL STRIP.AUTO: NEGATIVE MG/DL
RBC # BLD AUTO: 4.94 X10(6)/MCL (ref 4.7–6.1)
RBC #/AREA URNS AUTO: ABNORMAL /HPF
RBC UR QL AUTO: ABNORMAL UNIT/L
SODIUM SERPL-SCNC: 136 MMOL/L (ref 136–145)
SP GR UR STRIP.AUTO: 1
SQUAMOUS #/AREA URNS LPF: ABNORMAL /HPF
TROPONIN I SERPL-MCNC: <0.01 NG/ML (ref 0–0.04)
UROBILINOGEN UR STRIP-ACNC: NORMAL MG/DL
WBC # SPEC AUTO: 6.53 X10(3)/MCL (ref 4.5–11.5)
WBC #/AREA URNS AUTO: ABNORMAL /HPF

## 2023-06-21 PROCEDURE — 80048 BASIC METABOLIC PNL TOTAL CA: CPT | Performed by: PHYSICIAN ASSISTANT

## 2023-06-21 PROCEDURE — 82962 GLUCOSE BLOOD TEST: CPT

## 2023-06-21 PROCEDURE — 84484 ASSAY OF TROPONIN QUANT: CPT | Performed by: PHYSICIAN ASSISTANT

## 2023-06-21 PROCEDURE — 85025 COMPLETE CBC W/AUTO DIFF WBC: CPT | Performed by: PHYSICIAN ASSISTANT

## 2023-06-21 PROCEDURE — 99285 EMERGENCY DEPT VISIT HI MDM: CPT | Mod: 25

## 2023-06-21 PROCEDURE — 81001 URINALYSIS AUTO W/SCOPE: CPT | Performed by: PHYSICIAN ASSISTANT

## 2023-06-21 PROCEDURE — 93005 ELECTROCARDIOGRAM TRACING: CPT

## 2023-06-21 NOTE — ED PROVIDER NOTES
Encounter Date: 6/21/2023       History     Chief Complaint   Patient presents with    Hyperglycemia     PT REPORTS ELEVATED BLOOD GLUCOSE THIS AM. MILD POLYDIPSIA/POLYURIA, STATS COMPLIANT W MED AND DIET. . PT THEN REPORTS CP, RESOLVED  AT PRESENT.   EKG OBTAINED.      Patient reports to the ER with complaints of elevated blood sugar this morning; pt reports he went to the pharmacy to have them check his glucometer and ensure he was using it correctly (he was informed he was not), at which point it read 348; pt admits that has been eating wheat rice and bread under the impression that these had no sugar, as well as multiple different fruit; pt also reports occasional episodes of aching chest pain for the past several months - pt denies fever/sob    The history is provided by the patient.   Diabetes  This is a recurrent problem. He has type 2 diabetes mellitus. Associated symptoms include polydipsia and polyuria. Pertinent negatives for diabetes include no blurred vision, no visual change, no weakness and no weight loss. Pertinent negatives for hypoglycemia include no confusion, dizziness, headaches, nervousness/anxiousness, seizures, sleepiness, sweats or tremors. Pertinent negatives for hypoglycemia complications include no blackouts, no hospitalization and no required assistance. Pertinent negatives for diabetic complications include no autonomic neuropathy, CVA or peripheral neuropathy. Risk factors for coronary artery disease include male sex, obesity and diabetes mellitus. Current diabetic treatment includes oral agent (monotherapy). He is compliant with treatment most of the time. He is following a generally unhealthy diet. He rarely participates in exercise.   Review of patient's allergies indicates:  No Known Allergies  Past Medical History:   Diagnosis Date    Diabetes mellitus     GERD (gastroesophageal reflux disease)     Hypertension      History reviewed. No pertinent surgical history.  Family  History   Problem Relation Age of Onset    Hypertension Mother     Hypertension Father     Aneurysm Father      Social History     Tobacco Use    Smoking status: Former     Years: 40.00     Types: Cigarettes    Smokeless tobacco: Never   Substance Use Topics    Alcohol use: Never    Drug use: Yes     Frequency: 7.0 times per week     Types: Marijuana     Review of Systems   Constitutional:  Negative for fever and weight loss.   HENT:  Negative for sore throat.    Eyes:  Negative for blurred vision.   Respiratory:  Negative for shortness of breath.    Cardiovascular:  Negative for leg swelling.   Gastrointestinal:  Negative for nausea.   Endocrine: Positive for polydipsia and polyuria.   Genitourinary:  Negative for dysuria.   Musculoskeletal:  Negative for back pain.   Skin:  Negative for rash.   Neurological:  Negative for dizziness, tremors, seizures, weakness and headaches.   Hematological:  Does not bruise/bleed easily.   Psychiatric/Behavioral:  Negative for confusion. The patient is not nervous/anxious.      Physical Exam     Initial Vitals [06/21/23 1123]   BP Pulse Resp Temp SpO2   (!) 153/89 80 16 97.7 °F (36.5 °C) 99 %      MAP       --         Physical Exam    Vitals reviewed.  Constitutional: He appears well-developed.   HENT:   Head: Normocephalic and atraumatic.   Eyes: Conjunctivae and EOM are normal. Pupils are equal, round, and reactive to light.   Neck:   Normal range of motion.  Cardiovascular:  Normal rate, regular rhythm, normal heart sounds and intact distal pulses.           Pulmonary/Chest: Breath sounds normal. No respiratory distress. He has no wheezes. He has no rales. He exhibits no tenderness.   Abdominal: Abdomen is soft. Bowel sounds are normal. He exhibits no distension. There is no abdominal tenderness. There is no rebound.   Musculoskeletal:         General: Normal range of motion.      Cervical back: Normal range of motion.     Neurological: He is alert and oriented to person,  place, and time. He displays normal reflexes. No cranial nerve deficit or sensory deficit. GCS score is 15. GCS eye subscore is 4. GCS verbal subscore is 5. GCS motor subscore is 6.   Skin: Skin is warm. No pallor.   Psychiatric: He has a normal mood and affect. His behavior is normal. Judgment and thought content normal.       ED Course   Procedures  Labs Reviewed   BASIC METABOLIC PANEL - Abnormal; Notable for the following components:       Result Value    Glucose Level 268 (*)     All other components within normal limits   URINALYSIS, REFLEX TO URINE CULTURE - Abnormal; Notable for the following components:    Color, UA Colorless (*)     Glucose, UA 2+ (*)     Blood, UA 2+ (*)     Squamous Epithelial Cells, UA Trace (*)     RBC, UA 6-10 (*)     All other components within normal limits   CBC WITH DIFFERENTIAL - Abnormal; Notable for the following components:    Hct 41.8 (*)     MPV 10.7 (*)     All other components within normal limits   POCT GLUCOSE - Abnormal; Notable for the following components:    POCT Glucose 255 (*)     All other components within normal limits   POCT GLUCOSE - Abnormal; Notable for the following components:    POCT Glucose 213 (*)     All other components within normal limits   TROPONIN I - Normal   CBC W/ AUTO DIFFERENTIAL    Narrative:     The following orders were created for panel order CBC auto differential.  Procedure                               Abnormality         Status                     ---------                               -----------         ------                     CBC with Differential[[id number]]        Abnormal            Final result                 Please view results for these tests on the individual orders.   EXTRA TUBES    Narrative:     The following orders were created for panel order EXTRA TUBES.  Procedure                               Abnormality         Status                     ---------                               -----------         ------                      Light Blue Top Hold[[id number]]                              In process                 Gold Top Hold[[id number]]                                    In process                 Pink Top Hold[[id number]]                                    In process                   Please view results for these tests on the individual orders.   LIGHT BLUE TOP HOLD   GOLD TOP HOLD   PINK TOP HOLD   POCT GLUCOSE, HAND-HELD DEVICE     EKG Readings: (Independently Interpreted)   Initial Reading: No STEMI. Rhythm: Normal Sinus Rhythm. Heart Rate: 68. Ectopy: No Ectopy. Conduction: Normal. ST Segments: Normal ST Segments. T Waves: Normal. Clinical Impression: Normal Sinus Rhythm   ECG Results              EKG 12-lead (Chest Pain) Age >30 (In process)  Result time 06/21/23 13:17:11      In process by Interface, Lab In [facility] (06/21/23 13:17:11)                   Narrative:    Test Reason : R07.9,    Vent. Rate : 068 BPM     Atrial Rate : 068 BPM     P-R Int : 156 ms          QRS Dur : 102 ms      QT Int : 366 ms       P-R-T Axes : 082 083 065 degrees     QTc Int : 389 ms    Normal sinus rhythm  Normal ECG  When compared with ECG of 25-APR-2023 09:08,  No significant change was found    Referred By:             Confirmed By:                       In process by Interface, Lab In [facility] (06/21/23 11:43:57)                   Narrative:    Test Reason : R07.9,    Vent. Rate : 068 BPM     Atrial Rate : 068 BPM     P-R Int : 156 ms          QRS Dur : 102 ms      QT Int : 366 ms       P-R-T Axes : 082 083 065 degrees     QTc Int : 389 ms    Normal sinus rhythm  Normal ECG  When compared with ECG of 25-APR-2023 09:08,  No significant change was found    Referred By:             Confirmed By:                                   Imaging Results              X-Ray Chest 1 View (Final result)  Result time 06/21/23 12:09:54      Final result by Cristóbal Bruno MD (06/21/23 12:09:54)                   Impression:      No acute cardiopulmonary  process.      Electronically signed by: Cristóbal Bruno  Date:    06/21/2023  Time:    12:09               Narrative:    EXAMINATION:  XR CHEST 1 VIEW    CLINICAL HISTORY:  atypical chest pain;    TECHNIQUE:  Single view of the chest    COMPARISON:  04/25/2023    FINDINGS:  No focal opacification, pleural effusion, or pneumothorax.    The cardiomediastinal silhouette is within normal limits.    No acute osseous abnormality.                                       Medications - No data to display  Medical Decision Making:   Differential Diagnosis:   Hyperglycemia / dka   ED Management:  Discussed that with CBG under 250 and no gap, no further intervention needed at this time; also spent time with patient discussing his diet and how to improve it and limit carbs - with this new info patient will not be started on any additional meds at this time    Given strict ED return precautions. I have spoken with the patient and/or caregivers. I have explained the patient's condition, diagnoses and treatment plan based on the information available to me at this time. I have answered the patient's and/or caregiver's questions and addressed any concerns. The patient and/or caregivers have as good an understanding of the patient's diagnosis, condition and treatment plan as can be expected at this point. The vital signs have been stable. The patient's condition is stable and appropriate for discharge from the emergency department.      The patient will pursue further outpatient evaluation with the primary care physician or other designated or consulting physician as outlined in the discharge instructions. The patient and/or caregivers are agreeable to this plan of care and follow-up instructions have been explained in detail. The patient and/or caregivers have received these instructions in written format and have expressed an understanding of the discharge instructions. The patient and/or caregivers are aware that any significant  change in condition or worsening of symptoms should prompt an immediate return to this or the closest emergency department or a call to 911.             ED Course as of 06/21/23 1439   Wed Jun 21, 2023   1438 Sodium: 136 [AL]   1438 Chloride: 103 [AL]   1438 CO2: 24 [AL]   1438 Glucose(!): 268 [AL]   1438 POCT Glucose(!): 213 [AL]      ED Course User Index  [AL] STEVE Shahid                 Clinical Impression:   Final diagnoses:  [R07.9] Chest pain  [E11.69] Type 2 diabetes mellitus with other specified complication, without long-term current use of insulin (Primary)  [R07.89] Atypical chest pain        ED Disposition Condition    Discharge Stable          ED Prescriptions    None       Follow-up Information       Follow up With Specialties Details Why Contact Info    discharge followup    If your symptoms become WORSE or you DO NOT IMPROVE and you are unable to reach your health care provider, you should RETURN to the emergency department    Ruth Dunlap, [facility]   [facility] [place] [address]  [phone]      discharge info    Discussed all pertinent ED information, results, diagnosis and treatment plan; All questions and concerns were addressed at this time. Patient voices understanding of information and instructions. Patient is comfortable with plan and discharge             STEVE Shahid  06/21/23 1431

## 2023-06-26 ENCOUNTER — HOSPITAL ENCOUNTER (EMERGENCY)
Facility: HOSPITAL | Age: 58
Discharge: HOME | End: 2023-06-26
Attending: EMERGENCY MEDICINE
Payer: MEDICAID

## 2023-06-26 VITALS
SYSTOLIC BLOOD PRESSURE: 156 MMHG | TEMPERATURE: 99 F | HEART RATE: 98 BPM | DIASTOLIC BLOOD PRESSURE: 90 MMHG | RESPIRATION RATE: 20 BRPM | OXYGEN SATURATION: 98 %

## 2023-06-26 DIAGNOSIS — M54.41 ACUTE RIGHT-SIDED LOW BACK PAIN WITH RIGHT-SIDED SCIATICA: Primary | ICD-10-CM

## 2023-06-26 LAB — POCT GLUCOSE: 227 MG/DL (ref 70–110)

## 2023-06-26 PROCEDURE — 25000003 PHARM REV CODE 250

## 2023-06-26 PROCEDURE — 82962 GLUCOSE BLOOD TEST: CPT

## 2023-06-26 PROCEDURE — 63600175 PHARM REV CODE 636 W HCPCS

## 2023-06-26 PROCEDURE — 99284 EMERGENCY DEPT VISIT MOD MDM: CPT

## 2023-06-26 PROCEDURE — 96372 THER/PROPH/DIAG INJ SC/IM: CPT

## 2023-06-26 RX ORDER — CYCLOBENZAPRINE HCL 10 MG
10 TABLET ORAL 3 TIMES DAILY PRN
Status: DISCONTINUED | OUTPATIENT
Start: 2023-06-26 | End: 2023-06-26 | Stop reason: HOSPADM

## 2023-06-26 RX ORDER — KETOROLAC TROMETHAMINE 10 MG/1
10 TABLET, FILM COATED ORAL EVERY 6 HOURS
Qty: 20 TABLET | Refills: 0 | Status: SHIPPED | OUTPATIENT
Start: 2023-06-26 | End: 2023-06-27

## 2023-06-26 RX ORDER — DEXAMETHASONE SODIUM PHOSPHATE 4 MG/ML
8 INJECTION, SOLUTION INTRA-ARTICULAR; INTRALESIONAL; INTRAMUSCULAR; INTRAVENOUS; SOFT TISSUE
Status: COMPLETED | OUTPATIENT
Start: 2023-06-26 | End: 2023-06-26

## 2023-06-26 RX ORDER — METHOCARBAMOL 500 MG/1
500 TABLET, FILM COATED ORAL 4 TIMES DAILY
Qty: 40 TABLET | Refills: 0 | Status: SHIPPED | OUTPATIENT
Start: 2023-06-26 | End: 2023-06-27 | Stop reason: SINTOL

## 2023-06-26 RX ORDER — KETOROLAC TROMETHAMINE 30 MG/ML
60 INJECTION, SOLUTION INTRAMUSCULAR; INTRAVENOUS
Status: COMPLETED | OUTPATIENT
Start: 2023-06-26 | End: 2023-06-26

## 2023-06-26 RX ADMIN — DEXAMETHASONE SODIUM PHOSPHATE 8 MG: 4 INJECTION, SOLUTION INTRAMUSCULAR; INTRAVENOUS at 01:06

## 2023-06-26 RX ADMIN — KETOROLAC TROMETHAMINE 60 MG: 60 INJECTION, SOLUTION INTRAMUSCULAR at 01:06

## 2023-06-26 RX ADMIN — CYCLOBENZAPRINE 10 MG: 10 TABLET, FILM COATED ORAL at 01:06

## 2023-06-26 NOTE — ED PROVIDER NOTES
Encounter Date: 6/26/2023       History     Chief Complaint   Patient presents with    Back Pain     57-year-old male complaints right side back pain radiating down right leg for 2 days.    The history is provided by the patient. No  was used.   Review of patient's allergies indicates:  No Known Allergies  Past Medical History:   Diagnosis Date    Diabetes mellitus     GERD (gastroesophageal reflux disease)     Hypertension      No past surgical history on file.  Family History   Problem Relation Age of Onset    Hypertension Mother     Hypertension Father     Aneurysm Father      Social History     Tobacco Use    Smoking status: Former     Years: 40.00     Types: Cigarettes    Smokeless tobacco: Never   Substance Use Topics    Alcohol use: Never    Drug use: Yes     Frequency: 7.0 times per week     Types: Marijuana     Review of Systems   Constitutional: Negative.    HENT: Negative.     Eyes: Negative.    Respiratory: Negative.     Cardiovascular: Negative.    Gastrointestinal: Negative.    Endocrine: Negative.    Genitourinary: Negative.    Musculoskeletal:  Positive for back pain and myalgias.   Allergic/Immunologic: Negative for environmental allergies.   Neurological: Negative.    Hematological: Negative.    Psychiatric/Behavioral: Negative.     All other systems reviewed and are negative.    Physical Exam     Initial Vitals [06/26/23 1338]   BP Pulse Resp Temp SpO2   (!) 156/90 98 20 98.5 °F (36.9 °C) 98 %      MAP       --         Physical Exam    Nursing note and vitals reviewed.  Constitutional: He appears well-developed and well-nourished.   HENT:   Head: Normocephalic and atraumatic.   Right Ear: External ear normal.   Left Ear: External ear normal.   Nose: Nose normal.   Mouth/Throat: Oropharynx is clear and moist.   Eyes: Conjunctivae and EOM are normal. Pupils are equal, round, and reactive to light.   Neck: Neck supple.   Normal range of motion.  Cardiovascular:  Normal rate,  regular rhythm, normal heart sounds and intact distal pulses.           Pulmonary/Chest: Breath sounds normal.   Abdominal: Abdomen is soft. Bowel sounds are normal.   Musculoskeletal:         General: Tenderness present.      Cervical back: Normal range of motion and neck supple.     Neurological: He is alert and oriented to person, place, and time. He has normal strength and normal reflexes. GCS score is 15. GCS eye subscore is 4. GCS verbal subscore is 5. GCS motor subscore is 6.   Skin: Skin is warm. Capillary refill takes less than 2 seconds.   Psychiatric: He has a normal mood and affect. His behavior is normal. Judgment and thought content normal.       ED Course   Procedures  Labs Reviewed   POCT GLUCOSE - Abnormal; Notable for the following components:       Result Value    POCT Glucose 227 (*)     All other components within normal limits          Imaging Results    None          Medications   cyclobenzaprine tablet 10 mg (10 mg Oral Given 6/26/23 1357)   ketorolac injection 60 mg (60 mg Intramuscular Given 6/26/23 1356)   dexAMETHasone injection 8 mg (8 mg Intramuscular Given 6/26/23 1356)     Medical Decision Making:   Initial Assessment:   Awake alert and oriented ambulatory 57-year-old male gait steady presents emergency room with a 2 day history of right-sided hip pain radiating down leg.  Patient denies injury or heavy lifting.  Patient is in minor level distress.  Vital signs stable.  Skin pink warm dry.  Inner flat and nontender.  Patient is able to ambulate to treatment area without assistance.  Patient seated in the recliner.  No obvious surface trauma.  No abrasions scars or ecchymosis.  Soft tissue swelling not appreciated.  Mild tenderness to palpation to the to the sciatica and right hip.  Patient is able to perform bilateral leg raises without difficulty.  Distal pulses present dorsal and plantar flexion with good strength.  GCS 15 cranial nerves 2-12 intact  Differential Diagnosis:   Muscle  Strain  Sciatica  Spasm  ED Management:  Patient will be discharged home encouraged cool compresses and passive range of motion                        Clinical Impression:   Final diagnoses:  [M54.41] Acute right-sided low back pain with right-sided sciatica (Primary)        ED Disposition Condition    Discharge Stable          ED Prescriptions       Medication Sig Dispense Start Date End Date Auth. Provider    methocarbamoL (ROBAXIN) 500 MG Tab Take 1 tablet (500 mg total) by mouth 4 (four) times daily. for 10 days 40 tablet 6/26/2023 7/6/2023 Hortencia Aiken NP    ketorolac (TORADOL) 10 mg tablet Take 1 tablet (10 mg total) by mouth every 6 (six) hours. for 5 days 20 tablet 6/26/2023 7/1/2023 Hortencia Aiken NP          Follow-up Information       Follow up With Specialties Details Why Contact Info    Ruth Dunlap, P [facility]   [address] [facility] [address]  [phone]               Hortencia Aiken NP  06/26/23 6404

## 2023-06-26 NOTE — DISCHARGE INSTRUCTIONS
Patient to be discharged home.  Encouraged passive range of motion exercises.  Apply cool compresses to site twice a day.  Take medications as prescribed.  Avoid heavy lifting or straining.  Follow up with primary care provider as needed for recheck.

## 2023-06-27 ENCOUNTER — OFFICE VISIT (OUTPATIENT)
Dept: INTERNAL MEDICINE | Facility: CLINIC | Age: 58
End: 2023-06-27
Payer: MEDICAID

## 2023-06-27 ENCOUNTER — PATIENT OUTREACH (OUTPATIENT)
Dept: ADMINISTRATIVE | Facility: HOSPITAL | Age: 58
End: 2023-06-27
Payer: MEDICAID

## 2023-06-27 VITALS
DIASTOLIC BLOOD PRESSURE: 64 MMHG | BODY MASS INDEX: 30.09 KG/M2 | SYSTOLIC BLOOD PRESSURE: 142 MMHG | RESPIRATION RATE: 18 BRPM | HEART RATE: 79 BPM | WEIGHT: 227 LBS | TEMPERATURE: 98 F | HEIGHT: 73 IN

## 2023-06-27 DIAGNOSIS — F17.210 CIGARETTE NICOTINE DEPENDENCE WITHOUT COMPLICATION: Chronic | ICD-10-CM

## 2023-06-27 DIAGNOSIS — E11.65 TYPE 2 DIABETES MELLITUS WITH HYPERGLYCEMIA, WITHOUT LONG-TERM CURRENT USE OF INSULIN: ICD-10-CM

## 2023-06-27 DIAGNOSIS — M54.41 ACUTE MIDLINE LOW BACK PAIN WITH RIGHT-SIDED SCIATICA: Primary | ICD-10-CM

## 2023-06-27 DIAGNOSIS — I10 PRIMARY HYPERTENSION: Chronic | ICD-10-CM

## 2023-06-27 PROCEDURE — 3078F PR MOST RECENT DIASTOLIC BLOOD PRESSURE < 80 MM HG: ICD-10-PCS | Mod: CPTII,,, | Performed by: NURSE PRACTITIONER

## 2023-06-27 PROCEDURE — 1160F RVW MEDS BY RX/DR IN RCRD: CPT | Mod: CPTII,,, | Performed by: NURSE PRACTITIONER

## 2023-06-27 PROCEDURE — 1159F PR MEDICATION LIST DOCUMENTED IN MEDICAL RECORD: ICD-10-PCS | Mod: CPTII,,, | Performed by: NURSE PRACTITIONER

## 2023-06-27 PROCEDURE — 99214 OFFICE O/P EST MOD 30 MIN: CPT | Mod: 25,S$PBB,, | Performed by: NURSE PRACTITIONER

## 2023-06-27 PROCEDURE — 99214 PR OFFICE/OUTPT VISIT, EST, LEVL IV, 30-39 MIN: ICD-10-PCS | Mod: 25,S$PBB,, | Performed by: NURSE PRACTITIONER

## 2023-06-27 PROCEDURE — 99215 OFFICE O/P EST HI 40 MIN: CPT | Mod: PBBFAC | Performed by: NURSE PRACTITIONER

## 2023-06-27 PROCEDURE — 3078F DIAST BP <80 MM HG: CPT | Mod: CPTII,,, | Performed by: NURSE PRACTITIONER

## 2023-06-27 PROCEDURE — 99406 PR TOBACCO USE CESSATION INTERMEDIATE 3-10 MINUTES: ICD-10-PCS | Mod: S$PBB,,, | Performed by: NURSE PRACTITIONER

## 2023-06-27 PROCEDURE — 3077F SYST BP >= 140 MM HG: CPT | Mod: CPTII,,, | Performed by: NURSE PRACTITIONER

## 2023-06-27 PROCEDURE — 3008F BODY MASS INDEX DOCD: CPT | Mod: CPTII,,, | Performed by: NURSE PRACTITIONER

## 2023-06-27 PROCEDURE — 99406 BEHAV CHNG SMOKING 3-10 MIN: CPT | Mod: S$PBB,,, | Performed by: NURSE PRACTITIONER

## 2023-06-27 PROCEDURE — 3008F PR BODY MASS INDEX (BMI) DOCUMENTED: ICD-10-PCS | Mod: CPTII,,, | Performed by: NURSE PRACTITIONER

## 2023-06-27 PROCEDURE — 1159F MED LIST DOCD IN RCRD: CPT | Mod: CPTII,,, | Performed by: NURSE PRACTITIONER

## 2023-06-27 PROCEDURE — 3077F PR MOST RECENT SYSTOLIC BLOOD PRESSURE >= 140 MM HG: ICD-10-PCS | Mod: CPTII,,, | Performed by: NURSE PRACTITIONER

## 2023-06-27 PROCEDURE — 1160F PR REVIEW ALL MEDS BY PRESCRIBER/CLIN PHARMACIST DOCUMENTED: ICD-10-PCS | Mod: CPTII,,, | Performed by: NURSE PRACTITIONER

## 2023-06-27 RX ORDER — NAPROXEN 500 MG/1
500 TABLET ORAL 2 TIMES DAILY
Qty: 60 TABLET | Refills: 6 | Status: SHIPPED | OUTPATIENT
Start: 2023-06-27 | End: 2023-08-09

## 2023-06-27 RX ORDER — CYCLOBENZAPRINE HCL 10 MG
10 TABLET ORAL NIGHTLY
COMMUNITY
Start: 2023-06-26 | End: 2024-03-14

## 2023-06-27 NOTE — PROGRESS NOTES
Patient ID: [id number]     Chief Complaint: Leg Pain (1. Right leg pain hip on down.  2. Requst blood sugar to be checked)    HPI:     Wilfrido Henriquez is a 57 y.o. male with diagnosis of HTN, HLD, Gout, Sleep Apnea, Tobacco Use. Patient seen in clinic today for back pain. Patient last seen in clinic on 03/15/2023.   Today, patient states back pain. Patient denies injury. Patient seen in ED on 2023 for same complaint. Patient prescribed Robaxin and Toradol and discharged home. Patient states he is not taking the medication due to GI upset. Lumbar X-ray completed on 2021; indicated minimal hypertrophic facet changes at the lower lumbar spine, minimal degenerative changes at the SI joints. Patient states Naproxen works better and would like a refill.   Patient had CT Abdomen ordered 2022 for hematuria, completed on 2023; indicated multiple cysts in the kidneys stable since prior examination, otherwise unremarkable. Patient newly diagnosed with DM2. Started on Metformin and accuchecks. Patient states unable to work glucometer.   Patient prescribed Amlodipine 5 mg daily and HCTZ 25 mg daily for HTN. Patient states taking medication as prescribed, tolerating well. Patient states he does not check his B/P at home. Patient denies SOB, chest pain.   Patient denies any other acute complaints at this time.     Review of patient's allergies indicates:  No Known Allergies    Breast Cancer Screening: N/A  Cervical Cancer Screening: N/A  Colorectal Cancer Screening: Colonoscopy on 2021 with Dr. Lombardi, will try to obtain report  Diabetic Eye Exam: N/A  Diabetic Foot Exam: N/A  Lung Cancer Screening: ordered  Prostate Cancer Screenin.5 on 2022  AAA Screening: deferred due to age  Osteoporosis Screening: N/A  Medicare Wellness: N/A  Immunizations:   Immunization History   Administered Date(s) Administered    COVID-19, MRNA, LN-S, PF (MODERNA FULL 0.5 ML DOSE) 08/15/2021, 2021    Influenza -  Trivalent - PF (ADULT) 12/18/2015     History reviewed. No pertinent surgical history.    family history includes Aneurysm in his father; Hypertension in his father and mother.    Social History     Socioeconomic History    Marital status: Single   Occupational History    Occupation: unemployed   Tobacco Use    Smoking status: Former     Years: 40.00     Types: Cigarettes    Smokeless tobacco: Never   Substance and Sexual Activity    Alcohol use: Never    Drug use: Yes     Frequency: 7.0 times per week     Types: Marijuana    Sexual activity: Yes     Partners: Female     Current Outpatient Medications   Medication Instructions    allopurinoL (ZYLOPRIM) 300 mg, Oral, Daily    amLODIPine (NORVASC) 5 mg, Oral, Daily    blood sugar diagnostic Strp 1 each, Misc.(Non-Drug; Combo Route), 2 times daily    blood-glucose meter (FREESTYLE SYSTEM KIT) kit Use as instructed    blood-glucose meter kit Check glucose 3x a day with meals    clotrimazole (LOTRIMIN) 1 % cream Topical (Top), 2 times daily    cyclobenzaprine (FLEXERIL) 10 mg, Oral, Nightly    hydroCHLOROthiazide (HYDRODIURIL) 25 mg, Oral, Daily    hydrOXYzine HCL (ATARAX) 25 mg, Oral, 3 times daily PRN    lancets (ONETOUCH ULTRASOFT LANCETS) Misc 1 Box, Misc.(Non-Drug; Combo Route), 2 times daily    metFORMIN (GLUCOPHAGE) 500 mg, Oral, 2 times daily with meals    naproxen (NAPROSYN) 500 mg, Oral, 2 times daily    olopatadine (PATANOL) 0.1 % ophthalmic solution 1 drop, Both Eyes, 2 times daily    omeprazole (PRILOSEC) 20 mg, Oral, Daily       Subjective:     Review of Systems   Constitutional: Negative.    HENT: Negative.     Eyes: Negative.    Respiratory: Negative.     Cardiovascular: Negative.    Gastrointestinal: Negative.    Endocrine: Negative.    Genitourinary: Negative.    Musculoskeletal:  Positive for back pain.   Skin: Negative.    Allergic/Immunologic: Negative.    Neurological: Negative.    Hematological: Negative.    Psychiatric/Behavioral: Negative.    "    Objective:     Visit Vitals  BP (!) 142/64 (BP Location: Left arm, Patient Position: Sitting, BP Method: Large (Manual))   Pulse 79   Temp 98.4 °F (36.9 °C) (Oral)   Resp 18   Ht 6' 0.99" (1.854 m)   Wt 103 kg (227 lb)   BMI 29.96 kg/m²       Physical Exam  Vitals reviewed.   Constitutional:       Appearance: Normal appearance.   HENT:      Head: Normocephalic and atraumatic.      Mouth/Throat:      Mouth: Mucous membranes are moist.      Pharynx: Oropharynx is clear.   Eyes:      Extraocular Movements: Extraocular movements intact.      Conjunctiva/sclera: Conjunctivae normal.      Pupils: Pupils are equal, round, and reactive to light.   Cardiovascular:      Rate and Rhythm: Normal rate and regular rhythm.      Heart sounds: Normal heart sounds.   Pulmonary:      Effort: Pulmonary effort is normal.      Breath sounds: Normal breath sounds.   Abdominal:      General: Bowel sounds are normal.   Musculoskeletal:         General: Normal range of motion.      Cervical back: Normal range of motion.   Skin:     General: Skin is warm and dry.   Neurological:      Mental Status: He is alert and oriented to person, place, and time.   Psychiatric:         Mood and Affect: Mood normal.         Behavior: Behavior normal.       Labs Reviewed:     Hematology:  Lab Results   Component Value Date    WBC 6.53 06/21/2023    HGB 14.5 06/21/2023    HCT 41.8 (L) 06/21/2023     06/21/2023     Chemistry:  Lab Results   Component Value Date     06/21/2023    K 4.1 06/21/2023    CHLORIDE 103 06/21/2023    BUN 11.1 06/21/2023    CREATININE 1.01 06/21/2023    EGFRNORACEVR >60 06/21/2023    GLUCOSE 268 (H) 06/21/2023    CALCIUM 10.1 06/21/2023    ALKPHOS 76 06/19/2023    LABPROT 7.3 06/19/2023    ALBUMIN 4.3 06/19/2023    BILIDIR 0.1 08/19/2021    IBILI 0.30 08/19/2021    AST 20 06/19/2023    ALT 26 06/19/2023     Lab Results   Component Value Date    HGBA1C 11.9 (H) 06/15/2023     Lipid Panel:  Lab Results   Component Value " Date    CHOL 189 05/12/2022    HDL 40 05/12/2022    .00 05/12/2022    TRIG 180 (H) 05/12/2022    TOTALCHOLEST 5 05/12/2022     Thyroid:  Lab Results   Component Value Date    TSH 1.1096 05/12/2022     Urine:  Lab Results   Component Value Date    COLORUA Colorless (A) 06/21/2023    APPEARANCEUA Clear 06/21/2023    SGUA 1.004 06/21/2023    PHUA 6.0 06/21/2023    PROTEINUA Negative 06/21/2023    GLUCOSEUA 2+ (A) 06/21/2023    KETONESUA Negative 06/21/2023    BLOODUA 2+ (A) 06/21/2023    NITRITESUA Negative 06/21/2023    LEUKOCYTESUR Negative 06/21/2023    RBCUA 6-10 (A) 06/21/2023    WBCUA None Seen 06/21/2023    BACTERIA None Seen 06/21/2023    SQEPUA Trace (A) 06/21/2023    HYALINECASTS None Seen 06/21/2023    CREATRANDUR 228.3 (H) 05/12/2022        Assessment:       ICD-10-CM ICD-9-CM   1. Acute midline low back pain with right-sided sciatica  M54.41 724.2     724.3   2. Primary hypertension  I10 401.9   3. Type 2 diabetes mellitus with hyperglycemia, without long-term current use of insulin  E11.65 250.00     790.29   4. Cigarette nicotine dependence without complication  F17.210 305.1        Plan:     1. Acute midline low back pain with right-sided sciatica  Start Naproxen 500 mg bid    2. Primary hypertension  Vitals:    06/27/23 0948   BP: (!) 142/64   Pulse:    Resp:    Temp:       Results for orders placed or performed in visit on 05/12/22   Microalbumin/Creatinine Ratio, Urine   Result Value Ref Range    Urine Microalbumin 178.8 (H) <=30.0 ug/ml    Urine Creatinine 228.3 (H) 63.0 - 166.0 mg/dL    Microalbumin Creatinine Ratio 78.3 (H) 0.0 - 30.0 mg/gm Cr     Results for orders placed or performed during the hospital encounter of 06/21/23   EKG 12-lead (Chest Pain) Age >30    Collection Time: 06/21/23 11:34 AM    Narrative    Test Reason : R07.9,    Vent. Rate : 068 BPM     Atrial Rate : 068 BPM     P-R Int : 156 ms          QRS Dur : 102 ms      QT Int : 366 ms       P-R-T Axes : 082 083 065 degrees      QTc Int : 389 ms    Normal sinus rhythm  Normal ECG    Confirmed by Eleazar Nassar MD ([id number]) on 6/21/2023 3:30:22 PM    Referred By:             Confirmed By:Eleazar Nassar MD   Continue Amlodipine 5 mg daily, HCTZ 25 mg daily  DASH diet  Encouraged home blood pressure monitoring    3. Type 2 diabetes mellitus with hyperglycemia, without long-term current use of insulin  Continue Metformin 500 mg bid  Encouraged home CBG monitoring.  Hypoglycemic episodes: denies  Body mass index is 29.96 kg/m².  Hemoglobin A1c   Date Value Ref Range Status   06/15/2023 11.9 (H) <=7.0 % Final     Results for orders placed or performed in visit on 05/12/22   Microalbumin/Creatinine Ratio, Urine   Result Value Ref Range    Urine Microalbumin 178.8 (H) <=30.0 ug/ml    Urine Creatinine 228.3 (H) 63.0 - 166.0 mg/dL    Microalbumin Creatinine Ratio 78.3 (H) 0.0 - 30.0 mg/gm Cr   No Statin noted  No ACE/ARB noted  Weight Loss Encouraged  ADA Diet  - Ambulatory referral/consult to Diabetes Education; Future    4. Cigarette nicotine dependence without complication  Smoking cessation education provided, encouraged. Informed of smoking cessation program. Patient refused smoking cessation at this time. I spent 3 minutes discussing smoking cessation with patient.       Follow up in about 6 weeks (around 8/8/2023) for Labs. In addition to their scheduled follow up, the patient has also been instructed to follow up on as needed basis.     Ruth Dunlap, BRADY

## 2023-07-07 ENCOUNTER — DOCUMENTATION ONLY (OUTPATIENT)
Dept: INTERNAL MEDICINE | Facility: CLINIC | Age: 58
End: 2023-07-07
Payer: MEDICAID

## 2023-07-14 ENCOUNTER — HOSPITAL ENCOUNTER (EMERGENCY)
Facility: HOSPITAL | Age: 58
Discharge: HOME | End: 2023-07-14
Attending: INTERNAL MEDICINE
Payer: MEDICAID

## 2023-07-14 VITALS
RESPIRATION RATE: 18 BRPM | TEMPERATURE: 98 F | WEIGHT: 226 LBS | BODY MASS INDEX: 29.82 KG/M2 | SYSTOLIC BLOOD PRESSURE: 150 MMHG | OXYGEN SATURATION: 100 % | HEART RATE: 74 BPM | DIASTOLIC BLOOD PRESSURE: 81 MMHG

## 2023-07-14 DIAGNOSIS — L29.9 ITCHING: Primary | ICD-10-CM

## 2023-07-14 PROCEDURE — 99283 EMERGENCY DEPT VISIT LOW MDM: CPT

## 2023-07-14 RX ORDER — HYDROXYZINE HYDROCHLORIDE 25 MG/1
25 TABLET, FILM COATED ORAL 4 TIMES DAILY PRN
Qty: 20 TABLET | Refills: 0 | Status: SHIPPED | OUTPATIENT
Start: 2023-07-14 | End: 2023-08-09 | Stop reason: SDUPTHER

## 2023-07-14 NOTE — ED PROVIDER NOTES
Encounter Date: 7/14/2023       History     Chief Complaint   Patient presents with    Rash     Patient complaining of rash on arm and back that appears at night.      Patient with pmhx as delineated below presents today c/o generalized pruritus for a few days. He also says he gets a rash at night. He is not able to described the rash. Denies rash at present time. He has not started using any new products or medications.     The history is provided by the patient. No  was used.   Review of patient's allergies indicates:  No Known Allergies  Past Medical History:   Diagnosis Date    Diabetes mellitus     GERD (gastroesophageal reflux disease)     Hypertension      Past Surgical History:   Procedure Laterality Date    COLONOSCOPY W/ POLYPECTOMY  01/19/2021     Family History   Problem Relation Age of Onset    Hypertension Mother     Hypertension Father     Aneurysm Father      Social History     Tobacco Use    Smoking status: Former     Years: 40.00     Types: Cigarettes    Smokeless tobacco: Never   Substance Use Topics    Alcohol use: Never    Drug use: Yes     Frequency: 7.0 times per week     Types: Marijuana     Review of Systems   Constitutional:  Negative for chills and fever.   Respiratory:  Negative for cough, chest tightness and shortness of breath.    Cardiovascular:  Negative for chest pain, palpitations and leg swelling.   Gastrointestinal:  Negative for abdominal pain, constipation, diarrhea, nausea and vomiting.   Genitourinary:  Negative for dysuria, flank pain and hematuria.   Musculoskeletal:  Negative for arthralgias and myalgias.   Skin:  Positive for rash.   Allergic/Immunologic: Negative for immunocompromised state.   Neurological:  Negative for syncope and light-headedness.   All other systems reviewed and are negative.    Physical Exam     Initial Vitals [07/14/23 0859]   BP Pulse Resp Temp SpO2   (!) 150/81 74 18 98.3 °F (36.8 °C) 100 %      MAP       --         Physical  Exam    Nursing note and vitals reviewed.  Constitutional: He appears well-developed and well-nourished. He is not diaphoretic. No distress.   HENT:   Head: Normocephalic and atraumatic.   Mouth/Throat: Oropharynx is clear and moist. No oropharyngeal exudate.   Eyes: Conjunctivae and EOM are normal.   Neck: Neck supple.   Normal range of motion.  Cardiovascular:  Normal rate, regular rhythm, normal heart sounds and intact distal pulses.           Pulmonary/Chest: Breath sounds normal. No respiratory distress.   Abdominal: Abdomen is soft. He exhibits no distension. There is no abdominal tenderness. There is no rebound and no guarding.   Musculoskeletal:         General: No edema.      Cervical back: Normal range of motion and neck supple.     Neurological: He is alert and oriented to person, place, and time. GCS score is 15. GCS eye subscore is 4. GCS verbal subscore is 5. GCS motor subscore is 6.   Skin: Skin is warm and dry. Capillary refill takes less than 2 seconds. No rash noted.   Psychiatric: He has a normal mood and affect.       ED Course   Procedures  Labs Reviewed - No data to display       Imaging Results    None          Medications - No data to display  Medical Decision Making:   History:   Old Records Summarized: other records and records from clinic visits.  Initial Assessment:   Patient with pruritus and intermittent rash. No rash on exam.   Differential Diagnosis:   Pruritus, contact dermatitis, hives   Recommend hydroxyzine prn for itching. Advised to take pictures of rash and f/u with pcp. ED precautions given.                         Clinical Impression:   Final diagnoses:  [L29.9] Itching (Primary)        ED Disposition Condition    Discharge Stable          ED Prescriptions       Medication Sig Dispense Start Date End Date Auth. Provider    hydrOXYzine HCL (ATARAX) 25 MG tablet Take 1 tablet (25 mg total) by mouth 4 (four) times daily as needed for Itching. 20 tablet 7/14/2023 -- Edilma Garza  STEVE Villela          Follow-up Information       Follow up With Specialties Details Why Contact Info    [facility] Dept Emergency Medicine  If symptoms worsen return to ED immediately [address] [facility] [id number] [phone]    Ruth Dunlap, [facility] In 2 days  [address] [facility] [address]  [phone]               STEVE Mendoza  07/14/23 0949

## 2023-08-08 ENCOUNTER — HOSPITAL ENCOUNTER (EMERGENCY)
Facility: HOSPITAL | Age: 58
Discharge: HOME | End: 2023-08-08
Attending: INTERNAL MEDICINE
Payer: MEDICAID

## 2023-08-08 VITALS
HEIGHT: 67 IN | TEMPERATURE: 98 F | BODY MASS INDEX: 35.29 KG/M2 | WEIGHT: 224.88 LBS | HEART RATE: 88 BPM | RESPIRATION RATE: 18 BRPM | OXYGEN SATURATION: 99 % | DIASTOLIC BLOOD PRESSURE: 80 MMHG | SYSTOLIC BLOOD PRESSURE: 156 MMHG

## 2023-08-08 DIAGNOSIS — T78.40XA ALLERGIC REACTION, INITIAL ENCOUNTER: Primary | ICD-10-CM

## 2023-08-08 PROCEDURE — 63600175 PHARM REV CODE 636 W HCPCS: Performed by: PHYSICIAN ASSISTANT

## 2023-08-08 PROCEDURE — 25000003 PHARM REV CODE 250: Performed by: PHYSICIAN ASSISTANT

## 2023-08-08 PROCEDURE — 99283 EMERGENCY DEPT VISIT LOW MDM: CPT

## 2023-08-08 RX ORDER — DIPHENHYDRAMINE HCL 25 MG
25 CAPSULE ORAL
Status: COMPLETED | OUTPATIENT
Start: 2023-08-08 | End: 2023-08-08

## 2023-08-08 RX ORDER — PREDNISONE 10 MG/1
40 TABLET ORAL
Status: COMPLETED | OUTPATIENT
Start: 2023-08-08 | End: 2023-08-08

## 2023-08-08 RX ORDER — HYDROXYZINE PAMOATE 25 MG/1
25 CAPSULE ORAL EVERY 8 HOURS PRN
Qty: 15 CAPSULE | Refills: 0 | Status: SHIPPED | OUTPATIENT
Start: 2023-08-08 | End: 2023-08-09 | Stop reason: SDUPTHER

## 2023-08-08 RX ADMIN — PREDNISONE 40 MG: 10 TABLET ORAL at 09:08

## 2023-08-08 RX ADMIN — DIPHENHYDRAMINE HYDROCHLORIDE 25 MG: 25 CAPSULE ORAL at 09:08

## 2023-08-08 NOTE — ED PROVIDER NOTES
Encounter Date: 8/8/2023       History     Chief Complaint   Patient presents with    Allergic Reaction     Pt reports he is having an allergic reaction to his metformin. Pt has no hives, SOB, or trouble swallowing at this time.      58 year old male with a hx of DM, GERD and HTN, presents to the emergency department with concerns for allergic reaction to his metformin.   He states he has hand swelling and occasional itching x 1 month.  He states he began taking Metformin 2-3 months ago.  No obvious swelling or rash.   Denies SOB, hives, chest pain, facial swelling, abdominal pain, nausea, vomiting, dizziness, trouble swallowing.      The history is provided by the patient. No  was used.     Review of patient's allergies indicates:  No Known Allergies  Past Medical History:   Diagnosis Date    Diabetes mellitus     GERD (gastroesophageal reflux disease)     Hypertension      Past Surgical History:   Procedure Laterality Date    COLONOSCOPY W/ POLYPECTOMY  01/19/2021     Family History   Problem Relation Age of Onset    Hypertension Mother     Hypertension Father     Aneurysm Father      Social History     Tobacco Use    Smoking status: Former     Current packs/day: 0.00     Types: Cigarettes    Smokeless tobacco: Never   Substance Use Topics    Alcohol use: Never    Drug use: Yes     Frequency: 7.0 times per week     Types: Marijuana     Review of Systems   Constitutional:  Negative for appetite change, chills and fever.   HENT:  Negative for congestion, facial swelling and sore throat.    Eyes: Negative.    Respiratory:  Negative for cough and shortness of breath.    Cardiovascular:  Negative for chest pain and palpitations.   Gastrointestinal:  Negative for abdominal pain, nausea and vomiting.   Musculoskeletal:  Negative for back pain, myalgias and neck pain.        Bi hand swelling     Skin:  Negative for color change, rash and wound.        Itchy skin     Neurological:  Negative for  dizziness, light-headedness and headaches.       Physical Exam     Initial Vitals [08/08/23 0913]   BP Pulse Resp Temp SpO2   (!) 160/80 90 16 98.2 °F (36.8 °C) 100 %      MAP       --         Physical Exam    Nursing note and vitals reviewed.  Constitutional: He appears well-developed and well-nourished.   HENT:   Nose: Nose normal.   Mouth/Throat: Oropharynx is clear and moist.   No oral swelling or angioedema    Eyes: Conjunctivae are normal.   Neck: Neck supple.   Normal range of motion.  Cardiovascular:  Normal rate, regular rhythm, normal heart sounds and intact distal pulses.           Pulmonary/Chest: Breath sounds normal. No respiratory distress. He has no wheezes. He has no rhonchi. He has no rales. He exhibits no tenderness.   Abdominal: Abdomen is soft. Bowel sounds are normal. There is no abdominal tenderness.   Musculoskeletal:         General: Normal range of motion.      Cervical back: Normal range of motion and neck supple.     Neurological: He is alert and oriented to person, place, and time. GCS score is 15. GCS eye subscore is 4. GCS verbal subscore is 5. GCS motor subscore is 6.   Skin: Skin is warm. Capillary refill takes less than 2 seconds.         ED Course   Procedures  Labs Reviewed - No data to display       Imaging Results    None          Medications   predniSONE tablet 40 mg (40 mg Oral Given 8/8/23 0933)   diphenhydrAMINE capsule 25 mg (25 mg Oral Given 8/8/23 0933)     Medical Decision Making:   Initial Assessment:   58 year old male with a hx of DM, GERD and HTN, presents to the emergency department with concerns for allergic reaction to his metformin.   He states he has hand swelling and occasional itching.  He states he began taking Metformin 2-3 months ago.    Differential Diagnosis:   Allergic reaction  Eczema     ED Management:  Prednisone 40 mg tab, benadryl 25 mg tab given for symptoms.     Patient has follow up appointment with his PCP tomorrow   The patient is resting  comfortably and in no acute distress.  He states that his symptoms have improved after treatment in Emergency Department. I personally discussed his test results and treatment plan.  Gave strict ED precautions and specific conditions for return to the emergency department and importance of follow up with pcp.  Patient voices understanding and agrees to the plan discussed. All patients' questions have been answered at this time. He has remained hemodynamically stable throughout entire stay in ED and is stable for discharge home.                           Clinical Impression:   Final diagnoses:  [T78.40XA] Allergic reaction, initial encounter (Primary)        ED Disposition Condition    Discharge Stable          ED Prescriptions       Medication Sig Dispense Start Date End Date Auth. Provider    hydrOXYzine pamoate (VISTARIL) 25 MG Cap Take 1 capsule (25 mg total) by mouth every 8 (eight) hours as needed (Itching). 15 capsule 8/8/2023 -- Maty Sadler PA          Follow-up Information       Follow up With Specialties Details Why Contact Info    [facility] Emergency Dept Emergency Medicine  As needed, If symptoms worsen [address] [facility] [id number] [phone]             Maty Sadler PA  08/08/23 3753

## 2023-08-09 ENCOUNTER — OFFICE VISIT (OUTPATIENT)
Dept: INTERNAL MEDICINE | Facility: CLINIC | Age: 58
End: 2023-08-09
Payer: MEDICAID

## 2023-08-09 VITALS
HEIGHT: 66 IN | HEART RATE: 72 BPM | SYSTOLIC BLOOD PRESSURE: 124 MMHG | DIASTOLIC BLOOD PRESSURE: 70 MMHG | OXYGEN SATURATION: 100 % | RESPIRATION RATE: 18 BRPM | BODY MASS INDEX: 36.38 KG/M2 | WEIGHT: 226.38 LBS | TEMPERATURE: 99 F

## 2023-08-09 DIAGNOSIS — E66.01 CLASS 2 SEVERE OBESITY DUE TO EXCESS CALORIES WITH SERIOUS COMORBIDITY AND BODY MASS INDEX (BMI) OF 36.0 TO 36.9 IN ADULT: Chronic | ICD-10-CM

## 2023-08-09 DIAGNOSIS — F17.210 CIGARETTE NICOTINE DEPENDENCE WITHOUT COMPLICATION: Chronic | ICD-10-CM

## 2023-08-09 DIAGNOSIS — I10 PRIMARY HYPERTENSION: Chronic | ICD-10-CM

## 2023-08-09 DIAGNOSIS — L29.9 PRURITUS: Primary | ICD-10-CM

## 2023-08-09 DIAGNOSIS — E11.65 TYPE 2 DIABETES MELLITUS WITH HYPERGLYCEMIA, WITHOUT LONG-TERM CURRENT USE OF INSULIN: Chronic | ICD-10-CM

## 2023-08-09 PROBLEM — E66.812 CLASS 2 SEVERE OBESITY DUE TO EXCESS CALORIES WITH SERIOUS COMORBIDITY AND BODY MASS INDEX (BMI) OF 36.0 TO 36.9 IN ADULT: Chronic | Status: ACTIVE | Noted: 2022-05-12

## 2023-08-09 PROCEDURE — 99214 PR OFFICE/OUTPT VISIT, EST, LEVL IV, 30-39 MIN: ICD-10-PCS | Mod: S$PBB,25,, | Performed by: NURSE PRACTITIONER

## 2023-08-09 PROCEDURE — 1159F MED LIST DOCD IN RCRD: CPT | Mod: CPTII,,, | Performed by: NURSE PRACTITIONER

## 2023-08-09 PROCEDURE — 3078F PR MOST RECENT DIASTOLIC BLOOD PRESSURE < 80 MM HG: ICD-10-PCS | Mod: CPTII,,, | Performed by: NURSE PRACTITIONER

## 2023-08-09 PROCEDURE — 99406 BEHAV CHNG SMOKING 3-10 MIN: CPT | Mod: S$PBB,,, | Performed by: NURSE PRACTITIONER

## 2023-08-09 PROCEDURE — 3078F DIAST BP <80 MM HG: CPT | Mod: CPTII,,, | Performed by: NURSE PRACTITIONER

## 2023-08-09 PROCEDURE — 1160F PR REVIEW ALL MEDS BY PRESCRIBER/CLIN PHARMACIST DOCUMENTED: ICD-10-PCS | Mod: CPTII,,, | Performed by: NURSE PRACTITIONER

## 2023-08-09 PROCEDURE — 3008F PR BODY MASS INDEX (BMI) DOCUMENTED: ICD-10-PCS | Mod: CPTII,,, | Performed by: NURSE PRACTITIONER

## 2023-08-09 PROCEDURE — 99215 OFFICE O/P EST HI 40 MIN: CPT | Mod: PBBFAC | Performed by: NURSE PRACTITIONER

## 2023-08-09 PROCEDURE — 1159F PR MEDICATION LIST DOCUMENTED IN MEDICAL RECORD: ICD-10-PCS | Mod: CPTII,,, | Performed by: NURSE PRACTITIONER

## 2023-08-09 PROCEDURE — 3074F SYST BP LT 130 MM HG: CPT | Mod: CPTII,,, | Performed by: NURSE PRACTITIONER

## 2023-08-09 PROCEDURE — 3008F BODY MASS INDEX DOCD: CPT | Mod: CPTII,,, | Performed by: NURSE PRACTITIONER

## 2023-08-09 PROCEDURE — 3062F POS MACROALBUMINURIA REV: CPT | Mod: CPTII,,, | Performed by: NURSE PRACTITIONER

## 2023-08-09 PROCEDURE — 3066F PR DOCUMENTATION OF TREATMENT FOR NEPHROPATHY: ICD-10-PCS | Mod: CPTII,,, | Performed by: NURSE PRACTITIONER

## 2023-08-09 PROCEDURE — 3066F NEPHROPATHY DOC TX: CPT | Mod: CPTII,,, | Performed by: NURSE PRACTITIONER

## 2023-08-09 PROCEDURE — 3074F PR MOST RECENT SYSTOLIC BLOOD PRESSURE < 130 MM HG: ICD-10-PCS | Mod: CPTII,,, | Performed by: NURSE PRACTITIONER

## 2023-08-09 PROCEDURE — 99214 OFFICE O/P EST MOD 30 MIN: CPT | Mod: S$PBB,25,, | Performed by: NURSE PRACTITIONER

## 2023-08-09 PROCEDURE — 99406 PR TOBACCO USE CESSATION INTERMEDIATE 3-10 MINUTES: ICD-10-PCS | Mod: S$PBB,,, | Performed by: NURSE PRACTITIONER

## 2023-08-09 PROCEDURE — 3062F PR POS MACROALBUMINURIA RESULT DOCUMENTED/REVIEW: ICD-10-PCS | Mod: CPTII,,, | Performed by: NURSE PRACTITIONER

## 2023-08-09 PROCEDURE — 1160F RVW MEDS BY RX/DR IN RCRD: CPT | Mod: CPTII,,, | Performed by: NURSE PRACTITIONER

## 2023-08-09 PROCEDURE — 3046F PR MOST RECENT HEMOGLOBIN A1C LEVEL > 9.0%: ICD-10-PCS | Mod: CPTII,,, | Performed by: NURSE PRACTITIONER

## 2023-08-09 PROCEDURE — 3046F HEMOGLOBIN A1C LEVEL >9.0%: CPT | Mod: CPTII,,, | Performed by: NURSE PRACTITIONER

## 2023-08-09 RX ORDER — HYDROCHLOROTHIAZIDE 25 MG/1
25 TABLET ORAL DAILY
Qty: 90 TABLET | Refills: 1 | Status: SHIPPED | OUTPATIENT
Start: 2023-08-09 | End: 2024-03-14 | Stop reason: SDUPTHER

## 2023-08-09 RX ORDER — ALLOPURINOL 300 MG/1
300 TABLET ORAL DAILY
Qty: 90 TABLET | Refills: 1 | Status: SHIPPED | OUTPATIENT
Start: 2023-08-09 | End: 2023-08-22 | Stop reason: SDUPTHER

## 2023-08-09 RX ORDER — AMLODIPINE BESYLATE 5 MG/1
5 TABLET ORAL DAILY
Qty: 90 TABLET | Refills: 1 | Status: SHIPPED | OUTPATIENT
Start: 2023-08-09 | End: 2024-03-14 | Stop reason: SDUPTHER

## 2023-08-09 RX ORDER — HYDROXYZINE HYDROCHLORIDE 25 MG/1
25 TABLET, FILM COATED ORAL 3 TIMES DAILY PRN
Qty: 60 TABLET | Refills: 3 | Status: SHIPPED | OUTPATIENT
Start: 2023-08-09 | End: 2024-03-14

## 2023-08-09 NOTE — PROGRESS NOTES
Patient ID: [id number]     Chief Complaint: Hypertension and Diabetes    HPI:     Wilfrido Henriquez is a 58 y.o. male with diagnosis of HTN, HLD, Gout, Sleep Apnea, Tobacco Use. Patient seen in clinic today for follow up on DM2. Patient last seen in clinic on 06/27/2023.   At previous appt, patient started on Metformin 500 mg bid for DM2. Patient presented to ED on 07/14/2023 and 08/08/2023 for rash.  Patient believes that rash is a result of his metformin.  Patient states rash comes and goes, itches, states rash to back and bilateral arms.  Patient states prescribed medications in ER that did help his itching and rash however rash returned.  Patient denies any chest tightness, throat itching, shortness of breath, chest.  CBG in clinic today is 113.  Patient states he does not check his blood sugar at because he has difficulty working his glucometer.  Patient did not bring glucometer for instructions during visit today.  Patient prescribed Amlodipine 5 mg daily and HCTZ 25 mg daily for HTN. Patient states taking medication as prescribed, tolerating well. Patient states he does not check his B/P at home.  BP today is 124/70.  Lumbar X-ray completed on 12/16/2021; indicated minimal hypertrophic facet changes at the lower lumbar spine, minimal degenerative changes at the SI joints. Patient states Naproxen works better.  Patient had CT Abdomen ordered 05/2022 for hematuria, completed on 06/19/2023; indicated multiple cysts in the kidneys stable since prior examination, otherwise unremarkable.   Patient denies any other acute complaints at this time.     Patient seen in ED yesterday for rash. Patient unsure if rash was related to starting Metformin. Patient given Prednisone 40 mg and Benadryl 25 mg in ED. Patient diagnosed with Allergic Reaction, prescribed Hydroxyzine 25 mg Q8h prn, and discharged home.     Review of patient's allergies indicates:  No Known Allergies    Breast Cancer Screening: N/A  Cervical Cancer  Screening: N/A  Colorectal Cancer Screening: Colonoscopy on 2021 with Dr. Lombardi, will try to obtain report  Diabetic Eye Exam: Ophthalmology referral ordered  Diabetic Foot Exam: deferred  Lung Cancer Screening: ordered  Prostate Cancer Screenin.5 on 2022  AAA Screening: deferred due to age  Osteoporosis Screening: N/A  Medicare Wellness: N/A  Immunizations:   Immunization History   Administered Date(s) Administered    COVID-19, MRNA, LN-S, PF (MODERNA FULL 0.5 ML DOSE) 08/15/2021, 2021    Influenza - Trivalent - PF (ADULT) 2015     Past Surgical History:   Procedure Laterality Date    COLONOSCOPY W/ POLYPECTOMY  2021     family history includes Aneurysm in his father; Hypertension in his father and mother.    Social History     Socioeconomic History    Marital status: Single   Occupational History    Occupation: unemployed   Tobacco Use    Smoking status: Every Day     Current packs/day: 0.00     Types: Cigarettes    Smokeless tobacco: Current   Substance and Sexual Activity    Alcohol use: Never    Drug use: Yes     Frequency: 7.0 times per week     Types: Marijuana    Sexual activity: Yes     Partners: Female     Current Outpatient Medications   Medication Instructions    allopurinoL (ZYLOPRIM) 300 mg, Oral, Daily    amLODIPine (NORVASC) 5 mg, Oral, Daily    blood sugar diagnostic Strp 1 each, Misc.(Non-Drug; Combo Route), 2 times daily    blood-glucose meter (FREESTYLE SYSTEM KIT) kit Use as instructed    blood-glucose meter kit Check glucose 3x a day with meals    clotrimazole (LOTRIMIN) 1 % cream Topical (Top), 2 times daily    cyclobenzaprine (FLEXERIL) 10 mg, Oral, Nightly    empagliflozin (JARDIANCE) 10 mg, Oral, Daily    hydroCHLOROthiazide (HYDRODIURIL) 25 mg, Oral, Daily    hydrOXYzine HCL (ATARAX) 25 mg, Oral, 3 times daily PRN    lancets (ONETOUCH ULTRASOFT LANCETS) Misc 1 Box , Misc.(Non-Drug; Combo Route), 2 times daily       Subjective:     Review of Systems  "  Constitutional: Negative.    HENT: Negative.     Eyes: Negative.    Respiratory: Negative.     Cardiovascular: Negative.    Gastrointestinal: Negative.    Endocrine: Negative.    Genitourinary: Negative.    Musculoskeletal: Negative.    Skin:  Positive for rash.   Allergic/Immunologic: Negative.    Neurological: Negative.    Hematological: Negative.    Psychiatric/Behavioral: Negative.         Objective:     Visit Vitals  /70 (BP Location: Left arm, Patient Position: Sitting, BP Method: X-Large (Automatic))   Pulse 72   Temp 99 °F (37.2 °C) (Oral)   Resp 18   Ht 5' 6" (1.676 m)   Wt 102.7 kg (226 lb 6.4 oz)   SpO2 100%   BMI 36.54 kg/m²     Physical Exam  Vitals reviewed.   Constitutional:       Appearance: Normal appearance.   HENT:      Head: Normocephalic and atraumatic.      Mouth/Throat:      Mouth: Mucous membranes are moist.      Pharynx: Oropharynx is clear.   Eyes:      Extraocular Movements: Extraocular movements intact.      Conjunctiva/sclera: Conjunctivae normal.      Pupils: Pupils are equal, round, and reactive to light.   Cardiovascular:      Rate and Rhythm: Normal rate and regular rhythm.      Heart sounds: Normal heart sounds.   Pulmonary:      Effort: Pulmonary effort is normal.      Breath sounds: Normal breath sounds.   Abdominal:      General: Bowel sounds are normal.   Musculoskeletal:         General: Normal range of motion.      Cervical back: Normal range of motion.   Skin:     General: Skin is warm and dry.   Neurological:      Mental Status: He is alert and oriented to person, place, and time.   Psychiatric:         Mood and Affect: Mood normal.         Behavior: Behavior normal.       Labs Reviewed:     Hematology:  Lab Results   Component Value Date    WBC 12.25 (H) 08/09/2023    HGB 14.8 08/09/2023    HCT 43.3 08/09/2023     08/09/2023     Chemistry:  Lab Results   Component Value Date     08/09/2023    K 3.8 08/09/2023    CHLORIDE 104 08/09/2023    BUN 11.5 " 08/09/2023    CREATININE 1.05 08/09/2023    EGFRNORACEVR >60 08/09/2023    GLUCOSE 108 (H) 08/09/2023    CALCIUM 10.5 (H) 08/09/2023    ALKPHOS 68 08/09/2023    LABPROT 7.6 08/09/2023    ALBUMIN 4.6 08/09/2023    BILIDIR 0.1 08/19/2021    IBILI 0.30 08/19/2021    AST 19 08/09/2023    ALT 17 08/09/2023    YHLHGGRB64TB 32.6 08/09/2023     Lab Results   Component Value Date    HGBA1C 11.9 (H) 06/15/2023     Lipid Panel:  Lab Results   Component Value Date    CHOL 197 08/09/2023    HDL 47 08/09/2023    .00 08/09/2023    TRIG 233 (H) 08/09/2023    TOTALCHOLEST 4 08/09/2023     Thyroid:  Lab Results   Component Value Date    TSH 0.957 08/09/2023     Urine:  Lab Results   Component Value Date    COLORUA Colorless (A) 06/21/2023    APPEARANCEUA Clear 06/21/2023    SGUA 1.004 06/21/2023    PHUA 6.0 06/21/2023    PROTEINUA Negative 06/21/2023    GLUCOSEUA 2+ (A) 06/21/2023    KETONESUA Negative 06/21/2023    BLOODUA 2+ (A) 06/21/2023    NITRITESUA Negative 06/21/2023    LEUKOCYTESUR Negative 06/21/2023    RBCUA 6-10 (A) 06/21/2023    WBCUA None Seen 06/21/2023    BACTERIA None Seen 06/21/2023    SQEPUA Trace (A) 06/21/2023    HYALINECASTS None Seen 06/21/2023    CREATRANDUR 47.7 (L) 08/09/2023        Assessment:       ICD-10-CM ICD-9-CM   1. Pruritus  L29.9 698.9   2. Type 2 diabetes mellitus with hyperglycemia, without long-term current use of insulin  E11.65 250.00     790.29   3. Primary hypertension  I10 401.9   4. Class 2 severe obesity due to excess calories with serious comorbidity and body mass index (BMI) of 36.0 to 36.9 in adult  E66.01 278.01    Z68.36 V85.36   5. Cigarette nicotine dependence without complication  F17.210 305.1          Plan:     1. Pruritus  No rash noted  Continue Hydroxyzine  - hydrOXYzine HCL (ATARAX) 25 MG tablet; Take 1 tablet (25 mg total) by mouth 3 (three) times daily as needed for Itching.  Dispense: 60 tablet; Refill: 3    2. Type 2 diabetes mellitus with hyperglycemia, without  long-term current use of insulin  Stop Metformin due to rash  Start Jardiance 10 mg daily  Encouraged home CBG monitoring.  Hypoglycemic episodes: denies  Body mass index is 36.54 kg/m².  Hemoglobin A1c   Date Value Ref Range Status   06/15/2023 11.9 (H) <=7.0 % Final     Results for orders placed or performed in visit on 08/09/23   Microalbumin/Creatinine Ratio, Urine   Result Value Ref Range    Urine Microalbumin 155.3 (H) <=30.0 ug/ml    Urine Creatinine 47.7 (L) 63.0 - 166.0 mg/dL    Microalbumin Creatinine Ratio 325.6 (H) 0.0 - 30.0 mg/gm Cr   No Statin noted  No ACE/ARB noted  Weight Loss Encouraged  ADA Diet  - Ambulatory referral/consult to Ophthalmology; Future  - Comprehensive Metabolic Panel; Future  - Hemoglobin A1C; Future    3. Primary hypertension  Vitals:    08/09/23 1220   BP: 124/70   Pulse:    Resp:    Temp:       Results for orders placed or performed in visit on 08/09/23   Microalbumin/Creatinine Ratio, Urine   Result Value Ref Range    Urine Microalbumin 155.3 (H) <=30.0 ug/ml    Urine Creatinine 47.7 (L) 63.0 - 166.0 mg/dL    Microalbumin Creatinine Ratio 325.6 (H) 0.0 - 30.0 mg/gm Cr     Results for orders placed or performed during the hospital encounter of 06/21/23   EKG 12-lead (Chest Pain) Age >30    Collection Time: 06/21/23 11:34 AM    Narrative    Test Reason : R07.9,    Vent. Rate : 068 BPM     Atrial Rate : 068 BPM     P-R Int : 156 ms          QRS Dur : 102 ms      QT Int : 366 ms       P-R-T Axes : 082 083 065 degrees     QTc Int : 389 ms    Normal sinus rhythm  Normal ECG    Confirmed by Eleazar Nassar MD ([id number]) on 6/21/2023 3:30:22 PM    Referred By:             Confirmed By:Eleazar Nassar MD   Continue Amlodipine 5 mg daily, HCTZ 25 mg daily  DASH diet  Encouraged home blood pressure monitoring    4. Class 2 severe obesity due to excess calories with serious comorbidity and body mass index (BMI) of 36.0 to 36.9 in adult  Body mass index is 36.54 kg/m².  Thyroid Stimulating Hormone   Date  Value Ref Range Status   08/09/2023 0.957 0.350 - 4.940 uIU/mL Final     Vit D 25 OH   Date Value Ref Range Status   08/09/2023 32.6 30.0 - 80.0 ng/mL Final     Hemoglobin A1c   Date Value Ref Range Status   06/15/2023 11.9 (H) <=7.0 % Final   Sleep Study:  Weight Loss Encouraged  Increase Physical Activity    5. Cigarette nicotine dependence without complication  Smoking cessation education provided, encouraged. Informed of smoking cessation program. Patient refused smoking cessation at this time. I spent 3 minutes discussing smoking cessation with patient.       Follow up in about 3 months (around 11/9/2023) for Labs. In addition to their scheduled follow up, the patient has also been instructed to follow up on as needed basis.     IRMA RiveroP

## 2023-08-22 ENCOUNTER — HOSPITAL ENCOUNTER (EMERGENCY)
Facility: HOSPITAL | Age: 58
Discharge: HOME | End: 2023-08-22
Attending: EMERGENCY MEDICINE
Payer: MEDICAID

## 2023-08-22 VITALS
HEART RATE: 69 BPM | SYSTOLIC BLOOD PRESSURE: 138 MMHG | TEMPERATURE: 98 F | BODY MASS INDEX: 32.28 KG/M2 | WEIGHT: 200 LBS | DIASTOLIC BLOOD PRESSURE: 75 MMHG | OXYGEN SATURATION: 99 % | RESPIRATION RATE: 20 BRPM

## 2023-08-22 DIAGNOSIS — M79.641 HAND PAIN, RIGHT: ICD-10-CM

## 2023-08-22 DIAGNOSIS — M10.9 ACUTE GOUT OF RIGHT WRIST, UNSPECIFIED CAUSE: Primary | ICD-10-CM

## 2023-08-22 PROCEDURE — 29125 APPL SHORT ARM SPLINT STATIC: CPT | Mod: RT

## 2023-08-22 PROCEDURE — 99284 EMERGENCY DEPT VISIT MOD MDM: CPT

## 2023-08-22 PROCEDURE — 96372 THER/PROPH/DIAG INJ SC/IM: CPT | Mod: 59

## 2023-08-22 PROCEDURE — 63600175 PHARM REV CODE 636 W HCPCS

## 2023-08-22 RX ORDER — ALLOPURINOL 100 MG/1
100 TABLET ORAL DAILY
Qty: 30 TABLET | Refills: 0 | Status: SHIPPED | OUTPATIENT
Start: 2023-08-22

## 2023-08-22 RX ORDER — KETOROLAC TROMETHAMINE 30 MG/ML
60 INJECTION, SOLUTION INTRAMUSCULAR; INTRAVENOUS
Status: COMPLETED | OUTPATIENT
Start: 2023-08-22 | End: 2023-08-22

## 2023-08-22 RX ADMIN — KETOROLAC TROMETHAMINE 60 MG: 60 INJECTION, SOLUTION INTRAMUSCULAR at 11:08

## 2023-08-22 NOTE — ED PROVIDER NOTES
Encounter Date: 8/22/2023       History     Chief Complaint   Patient presents with    rt hand pain      Since yesterday / pulse cayla 2+, cap refill <2sec/warm and pink/ denies injury     Right hand pain and swelling    The history is provided by the patient. No  was used.     Review of patient's allergies indicates:  No Known Allergies  Past Medical History:   Diagnosis Date    Diabetes mellitus     GERD (gastroesophageal reflux disease)     Hypertension      Past Surgical History:   Procedure Laterality Date    COLONOSCOPY W/ POLYPECTOMY  01/19/2021     Family History   Problem Relation Age of Onset    Hypertension Mother     Hypertension Father     Aneurysm Father      Social History     Tobacco Use    Smoking status: Every Day     Current packs/day: 0.00     Types: Cigarettes    Smokeless tobacco: Current   Substance Use Topics    Alcohol use: Never    Drug use: Yes     Frequency: 7.0 times per week     Types: Marijuana     Review of Systems   Constitutional: Negative.    HENT: Negative.     Eyes: Negative.    Respiratory: Negative.     Cardiovascular: Negative.    Gastrointestinal: Negative.    Endocrine: Negative.    Genitourinary: Negative.    Musculoskeletal:  Positive for joint swelling and myalgias.   Skin: Negative.    Allergic/Immunologic: Negative.    Neurological: Negative.    Hematological: Negative.    Psychiatric/Behavioral: Negative.     All other systems reviewed and are negative.      Physical Exam     Initial Vitals [08/22/23 0939]   BP Pulse Resp Temp SpO2   138/75 69 20 98.2 °F (36.8 °C) 99 %      MAP       --         Physical Exam    Nursing note and vitals reviewed.  Constitutional: He appears well-developed and well-nourished.   HENT:   Head: Normocephalic and atraumatic.   Right Ear: External ear normal.   Left Ear: External ear normal.   Nose: Nose normal.   Mouth/Throat: Oropharynx is clear and moist.   Eyes: Conjunctivae and EOM are normal. Pupils are equal, round,  and reactive to light.   Neck: Neck supple.   Normal range of motion.  Cardiovascular:  Normal rate, regular rhythm, normal heart sounds and intact distal pulses.           Pulmonary/Chest: Breath sounds normal.   Abdominal: Abdomen is soft. Bowel sounds are normal.   Musculoskeletal:         General: Tenderness and edema present.      Cervical back: Normal range of motion and neck supple.     Neurological: He is alert and oriented to person, place, and time. He has normal strength and normal reflexes. GCS score is 15. GCS eye subscore is 4. GCS verbal subscore is 5. GCS motor subscore is 6.   Skin: Skin is warm and dry. Capillary refill takes less than 2 seconds.   Psychiatric: He has a normal mood and affect. His behavior is normal. Judgment and thought content normal.         ED Course   Procedures  Labs Reviewed - No data to display       Imaging Results              X-Ray Wrist Complete Right (Final result)  Result time 08/22/23 10:22:56      Final result by Carrie Xiao MD (08/22/23 10:22:56)                   Impression:      No acute bony abnormality.      Electronically signed by: Carrie Xiao  Date:    08/22/2023  Time:    10:22               Narrative:    EXAMINATION:  XR WRIST COMPLETE 3 VIEWS RIGHT    CLINICAL HISTORY:  Pain in right hand    COMPARISON:  None.    FINDINGS:  There is no acute fracture or malalignment.  The soft tissues are unremarkable.                                       X-Ray Hand 3 view Right (Final result)  Result time 08/22/23 11:11:58   Procedure changed from X-Ray Hand 2 View Right     Final result by Keyla Paul MD (08/22/23 11:11:58)                   Impression:      No acute osseous abnormality.      Electronically signed by: Keyla Paul  Date:    08/22/2023  Time:    11:11               Narrative:    EXAMINATION:  XR HAND COMPLETE 3 VIEW RIGHT    CLINICAL HISTORY:  pain; Pain in right hand    TECHNIQUE:  PA, lateral, and oblique views of the right  hand were performed.    COMPARISON:  None    FINDINGS:  No fracture. No dislocation. There is mild remodeling at the 5th metacarpal.  Correlate for any history of remote prior injury.    Regional soft tissues are normal.                                       Medications   ketorolac injection 60 mg (60 mg Intramuscular Given 8/22/23 1150)     Medical Decision Making  Awake alert and oriented 58-year-old male presents emergency complaints of right wrist pain and swelling onset 2 days ago.  Patient is no acute distress vital signs stable.  The left wrist is unaffected.  Right wrist with soft tissue swelling no surface trauma.  Positive warmth erythema.  Distal pulse present.  Denies any injury.  Prior charts reviewed show a history of gout.  Patient denies knowing whether or not he has a history.  Extension and flexion present.  Distal motor neurovascular status intact.  Normal shoulder elbow.  Patient is able to fan fingers, complete fist.  Capillary refill less than 3.  All other review of systems within normal limits.  Patient  will be placed in splint for comfort.  GCS 15, cranial nerves 2-12 intact neurovascular status intact    Amount and/or Complexity of Data Reviewed  Radiology: ordered.     Details: no acute findings    Risk  Prescription drug management.                          Medical Decision Making:   Differential Diagnosis:   Gout   Sprain  fx      Clinical Impression:   Final diagnoses:  [M79.641] Hand pain, right  [M10.9] Acute gout of right wrist, unspecified cause (Primary)        ED Disposition Condition    Discharge Stable          ED Prescriptions       Medication Sig Dispense Start Date End Date Auth. Provider    allopurinoL (ZYLOPRIM) 100 MG tablet Take 1 tablet (100 mg total) by mouth once daily. 30 tablet 8/22/2023 -- Hortencia Aiken NP          Follow-up Information       Follow up With Specialties Details Why Contact [facility] FNP [facility]  As needed [address]  [facility] [address]  [phone]               Hortencia Aiken NP  08/22/23 1157

## 2023-08-22 NOTE — DISCHARGE INSTRUCTIONS
Patient will be discharged home.  Instructed to wear splint for 8 hours on 8 hours off.  Take anti-inflammatories as prescribed.  Follow up with your primary care for re-evaluation.

## 2023-12-20 ENCOUNTER — LAB VISIT (OUTPATIENT)
Dept: LAB | Facility: HOSPITAL | Age: 58
End: 2023-12-20
Attending: NURSE PRACTITIONER
Payer: MEDICAID

## 2023-12-20 ENCOUNTER — TELEPHONE (OUTPATIENT)
Dept: INTERNAL MEDICINE | Facility: CLINIC | Age: 58
End: 2023-12-20
Payer: MEDICAID

## 2023-12-20 DIAGNOSIS — E11.65 TYPE 2 DIABETES MELLITUS WITH HYPERGLYCEMIA, WITHOUT LONG-TERM CURRENT USE OF INSULIN: Chronic | ICD-10-CM

## 2023-12-20 LAB
ALBUMIN SERPL-MCNC: 4.6 G/DL (ref 3.5–5)
ALBUMIN/GLOB SERPL: 1.3 RATIO (ref 1.1–2)
ALP SERPL-CCNC: 75 UNIT/L (ref 40–150)
ALT SERPL-CCNC: 25 UNIT/L (ref 0–55)
AST SERPL-CCNC: 23 UNIT/L (ref 5–34)
BILIRUB SERPL-MCNC: 0.7 MG/DL
BUN SERPL-MCNC: 12.8 MG/DL (ref 8.4–25.7)
CALCIUM SERPL-MCNC: 10.5 MG/DL (ref 8.4–10.2)
CHLORIDE SERPL-SCNC: 106 MMOL/L (ref 98–107)
CO2 SERPL-SCNC: 24 MMOL/L (ref 22–29)
CREAT SERPL-MCNC: 0.98 MG/DL (ref 0.73–1.18)
EST. AVERAGE GLUCOSE BLD GHB EST-MCNC: 125.5 MG/DL
GFR SERPLBLD CREATININE-BSD FMLA CKD-EPI: >60 MLS/MIN/1.73/M2
GLOBULIN SER-MCNC: 3.6 GM/DL (ref 2.4–3.5)
GLUCOSE SERPL-MCNC: 98 MG/DL (ref 74–100)
HBA1C MFR BLD: 6 %
POTASSIUM SERPL-SCNC: 3.7 MMOL/L (ref 3.5–5.1)
PROT SERPL-MCNC: 8.2 GM/DL (ref 6.4–8.3)
SODIUM SERPL-SCNC: 141 MMOL/L (ref 136–145)

## 2023-12-20 PROCEDURE — 83036 HEMOGLOBIN GLYCOSYLATED A1C: CPT

## 2023-12-20 PROCEDURE — 36415 COLL VENOUS BLD VENIPUNCTURE: CPT

## 2023-12-20 PROCEDURE — 80053 COMPREHEN METABOLIC PANEL: CPT

## 2023-12-20 NOTE — TELEPHONE ENCOUNTER
----- Message from Ruth Dunlap, FNP sent at 12/20/2023  8:09 AM CST -----  Patient was a No Show on 12/14/2023, needs to be rescheduled.

## 2024-01-05 ENCOUNTER — TELEPHONE (OUTPATIENT)
Dept: INTERNAL MEDICINE | Facility: CLINIC | Age: 59
End: 2024-01-05
Payer: MEDICAID

## 2024-02-14 ENCOUNTER — HOSPITAL ENCOUNTER (EMERGENCY)
Facility: HOSPITAL | Age: 59
Discharge: HOME | End: 2024-02-14
Attending: EMERGENCY MEDICINE
Payer: MEDICAID

## 2024-02-14 VITALS
SYSTOLIC BLOOD PRESSURE: 148 MMHG | OXYGEN SATURATION: 100 % | HEIGHT: 70 IN | DIASTOLIC BLOOD PRESSURE: 72 MMHG | TEMPERATURE: 98 F | HEART RATE: 76 BPM | WEIGHT: 198.44 LBS | BODY MASS INDEX: 28.41 KG/M2 | RESPIRATION RATE: 18 BRPM

## 2024-02-14 DIAGNOSIS — G89.29 CHRONIC NECK PAIN: ICD-10-CM

## 2024-02-14 DIAGNOSIS — M54.2 CHRONIC NECK PAIN: ICD-10-CM

## 2024-02-14 DIAGNOSIS — R51.9 FRONTAL HEADACHE: Primary | ICD-10-CM

## 2024-02-14 LAB
POCT GLUCOSE: 115 MG/DL (ref 70–110)
POCT GLUCOSE: 117 MG/DL (ref 70–110)

## 2024-02-14 PROCEDURE — 99283 EMERGENCY DEPT VISIT LOW MDM: CPT

## 2024-02-14 PROCEDURE — 82962 GLUCOSE BLOOD TEST: CPT

## 2024-02-14 RX ORDER — BUTALBITAL, ACETAMINOPHEN AND CAFFEINE 50; 325; 40 MG/1; MG/1; MG/1
1 TABLET ORAL EVERY 6 HOURS PRN
Qty: 12 TABLET | Refills: 0 | Status: SHIPPED | OUTPATIENT
Start: 2024-02-14 | End: 2024-02-17

## 2024-03-14 ENCOUNTER — TELEPHONE (OUTPATIENT)
Dept: INTERNAL MEDICINE | Facility: CLINIC | Age: 59
End: 2024-03-14

## 2024-03-14 ENCOUNTER — OFFICE VISIT (OUTPATIENT)
Dept: INTERNAL MEDICINE | Facility: CLINIC | Age: 59
End: 2024-03-14
Payer: MEDICAID

## 2024-03-14 VITALS
HEART RATE: 68 BPM | DIASTOLIC BLOOD PRESSURE: 80 MMHG | HEIGHT: 70 IN | TEMPERATURE: 98 F | BODY MASS INDEX: 32.69 KG/M2 | RESPIRATION RATE: 18 BRPM | WEIGHT: 228.38 LBS | SYSTOLIC BLOOD PRESSURE: 137 MMHG

## 2024-03-14 DIAGNOSIS — E66.09 CLASS 1 OBESITY DUE TO EXCESS CALORIES WITH SERIOUS COMORBIDITY AND BODY MASS INDEX (BMI) OF 32.0 TO 32.9 IN ADULT: Primary | Chronic | ICD-10-CM

## 2024-03-14 DIAGNOSIS — F17.210 CIGARETTE NICOTINE DEPENDENCE WITHOUT COMPLICATION: Chronic | ICD-10-CM

## 2024-03-14 DIAGNOSIS — I10 PRIMARY HYPERTENSION: Chronic | ICD-10-CM

## 2024-03-14 DIAGNOSIS — E78.2 MIXED HYPERLIPIDEMIA: Chronic | ICD-10-CM

## 2024-03-14 DIAGNOSIS — R31.21 ASYMPTOMATIC MICROSCOPIC HEMATURIA: ICD-10-CM

## 2024-03-14 DIAGNOSIS — R07.9 CHEST PAIN, UNSPECIFIED TYPE: ICD-10-CM

## 2024-03-14 DIAGNOSIS — E11.65 TYPE 2 DIABETES MELLITUS WITH HYPERGLYCEMIA, WITHOUT LONG-TERM CURRENT USE OF INSULIN: Chronic | ICD-10-CM

## 2024-03-14 PROCEDURE — 1160F RVW MEDS BY RX/DR IN RCRD: CPT | Mod: CPTII,,, | Performed by: NURSE PRACTITIONER

## 2024-03-14 PROCEDURE — 99214 OFFICE O/P EST MOD 30 MIN: CPT | Mod: 25,S$PBB,, | Performed by: NURSE PRACTITIONER

## 2024-03-14 PROCEDURE — 3075F SYST BP GE 130 - 139MM HG: CPT | Mod: CPTII,,, | Performed by: NURSE PRACTITIONER

## 2024-03-14 PROCEDURE — 99406 BEHAV CHNG SMOKING 3-10 MIN: CPT | Mod: S$PBB,,, | Performed by: NURSE PRACTITIONER

## 2024-03-14 PROCEDURE — 1159F MED LIST DOCD IN RCRD: CPT | Mod: CPTII,,, | Performed by: NURSE PRACTITIONER

## 2024-03-14 PROCEDURE — 3008F BODY MASS INDEX DOCD: CPT | Mod: CPTII,,, | Performed by: NURSE PRACTITIONER

## 2024-03-14 PROCEDURE — 99215 OFFICE O/P EST HI 40 MIN: CPT | Mod: PBBFAC | Performed by: NURSE PRACTITIONER

## 2024-03-14 PROCEDURE — 3079F DIAST BP 80-89 MM HG: CPT | Mod: CPTII,,, | Performed by: NURSE PRACTITIONER

## 2024-03-14 RX ORDER — AMLODIPINE BESYLATE 10 MG/1
10 TABLET ORAL DAILY
Qty: 90 TABLET | Refills: 2 | Status: SHIPPED | OUTPATIENT
Start: 2024-03-14

## 2024-03-14 RX ORDER — HYDROCHLOROTHIAZIDE 25 MG/1
25 TABLET ORAL DAILY
Qty: 90 TABLET | Refills: 2 | Status: SHIPPED | OUTPATIENT
Start: 2024-03-14

## 2024-03-14 NOTE — TELEPHONE ENCOUNTER
----- Message from BRADY Rivero sent at 3/14/2024  9:16 AM CDT -----  Please obtain Colonoscopy report done with Dr. Lombardi done 12/2021. Release of info signed and sent 06/2023 but no report received.

## 2024-03-14 NOTE — PROGRESS NOTES
Patient ID: [id number]     Chief Complaint: Follow-up (C/o chest pain at times - denies at present. 2. States  blood pressure pills is not strong enough)    HPI:     Wilfrido Henriquez is a 58 y.o. male with diagnosis of HTN, HLD, Gout, Sleep Apnea, Tobacco Use. Patient seen in clinic today for follow up. Patient last seen in clinic on 2023.   At previous appointment, metformin discontinued due to rash and patient started on Jardiance 10 mg daily for DM2.  Previous A1c was 11.9.  Patient states he does not check in blood sugars at home, was prescribed a glucometer states he does not know how to use it in forgot to bring it to clinic for instruction.  Today, patient states high blood pressure at home.  Patient states he does not check his blood pressure but feels dizzy at times.  Patient states he started taking 2 amlodipine 5 mg tabs daily and dizziness and headaches resolved.  Patient currently prescribed HCTZ 25 mg daily and amlodipine 5 mg daily.  Patient states chest pain at times.  Patient had EKGs done 2023 that was normal.  Patient denies chest pain, shortness for breath today.  Patient denies any other acute complaints.       Review of patient's allergies indicates:  No Known Allergies    Breast Cancer Screening: N/A  Cervical Cancer Screening: N/A  Colorectal Cancer Screening: Colonoscopy on 2021 with Dr. Lombardi, will try to obtain report  Diabetic Eye Exam: Ophthalmology referral ordered  Diabetic Foot Exam: 2024  Lung Cancer Screening: ordered  Prostate Cancer Screenin.5 on 2022  AAA Screening: deferred due to age  Osteoporosis Screening: N/A  Medicare Wellness: N/A  Immunizations:   Immunization History   Administered Date(s) Administered    COVID-19, MRNA, LN-S, PF (MODERNA FULL 0.5 ML DOSE) 08/15/2021, 2021    Influenza - Trivalent - PF (ADULT) 2015     Past Surgical History:   Procedure Laterality Date    COLONOSCOPY W/ POLYPECTOMY  2021     family history  includes Aneurysm in his father; Hypertension in his father and mother.    Social History     Socioeconomic History    Marital status: Single   Occupational History    Occupation: unemployed   Tobacco Use    Smoking status: Every Day     Types: Cigarettes     Start date: 5/1/1981    Smokeless tobacco: Current   Substance and Sexual Activity    Alcohol use: Never    Drug use: Yes     Frequency: 7.0 times per week     Types: Marijuana    Sexual activity: Yes     Partners: Female     Social Determinants of Health     Financial Resource Strain: High Risk (3/14/2024)    Overall Financial Resource Strain (CARDIA)     Difficulty of Paying Living Expenses: Hard   Food Insecurity: Food Insecurity Present (3/14/2024)    Hunger Vital Sign     Worried About Running Out of Food in the Last Year: Sometimes true     Ran Out of Food in the Last Year: Never true   Transportation Needs: Unmet Transportation Needs (3/14/2024)    PRAPARE - Transportation     Lack of Transportation (Medical): Yes     Lack of Transportation (Non-Medical): Yes   Physical Activity: Inactive (3/14/2024)    Exercise Vital Sign     Days of Exercise per Week: 0 days     Minutes of Exercise per Session: 0 min   Stress: No Stress Concern Present (3/14/2024)    [employer] [place] [place] [employer] - Occupational Stress Questionnaire     Feeling of Stress : Only a little   Social Connections: Patient Declined (3/14/2024)    Social Connection and Isolation Panel [NHANES]     Frequency of Communication with Friends and Family: Patient declined     Frequency of Social Gatherings with Friends and Family: Patient declined     Attends Restorationist Services: Patient declined     Active Member of Clubs or Organizations: Patient declined     Attends Club or Organization Meetings: Patient declined     Marital Status: Patient declined   Housing Stability: Unknown (3/14/2024)    Housing Stability Vital Sign     Unable to Pay for Housing in the Last Year: No     Number of  "Places Lived in the Last Year: 1     Current Outpatient Medications   Medication Instructions    allopurinoL (ZYLOPRIM) 100 mg, Oral, Daily    amLODIPine (NORVASC) 10 mg, Oral, Daily    blood sugar diagnostic Strp 1 each, Misc.(Non-Drug; Combo Route), 2 times daily    blood-glucose meter (FREESTYLE SYSTEM KIT) kit Use as instructed    blood-glucose meter kit Check glucose 3x a day with meals    empagliflozin (JARDIANCE) 10 mg, Oral, Daily    hydroCHLOROthiazide (HYDRODIURIL) 25 mg, Oral, Daily    lancets (ONETOUCH ULTRASOFT LANCETS) Misc 1 Box , Misc.(Non-Drug; Combo Route), 2 times daily       Subjective:     Review of Systems   Constitutional: Negative.    HENT: Negative.     Eyes: Negative.    Respiratory: Negative.     Cardiovascular: Negative.    Gastrointestinal: Negative.    Endocrine: Negative.    Genitourinary: Negative.    Musculoskeletal: Negative.    Skin: Negative.    Allergic/Immunologic: Negative.    Neurological: Negative.    Hematological: Negative.    Psychiatric/Behavioral: Negative.         Objective:     Visit Vitals  /80 (BP Location: Left arm, Patient Position: Sitting, BP Method: Large (Automatic))   Pulse 68   Temp 98.3 °F (36.8 °C) (Oral)   Resp 18   Ht 5' 10.08" (1.78 m)   Wt 103.6 kg (228 lb 6.4 oz)   BMI 32.70 kg/m²     Physical Exam  Vitals reviewed.   Constitutional:       Appearance: Normal appearance. He is obese.   HENT:      Head: Normocephalic and atraumatic.      Mouth/Throat:      Mouth: Mucous membranes are moist.      Pharynx: Oropharynx is clear.   Eyes:      Extraocular Movements: Extraocular movements intact.      Conjunctiva/sclera: Conjunctivae normal.      Pupils: Pupils are equal, round, and reactive to light.   Cardiovascular:      Rate and Rhythm: Normal rate and regular rhythm.      Heart sounds: Normal heart sounds.   Pulmonary:      Effort: Pulmonary effort is normal.      Breath sounds: Normal breath sounds.   Abdominal:      General: Bowel sounds are " normal.   Musculoskeletal:         General: Normal range of motion.      Cervical back: Normal range of motion.   Skin:     General: Skin is warm and dry.   Neurological:      Mental Status: He is alert and oriented to person, place, and time.   Psychiatric:         Mood and Affect: Mood normal.         Behavior: Behavior normal.     Protective Sensation (w/ 10 gram monofilament):  Right: Intact  Left: Intact    Visual Inspection:  Normal -  Bilateral    Pedal Pulses:   Right: Present  Left: Present    Posterior Tibialis Pulses:   Right:Present  Left: Present      Labs Reviewed:     Hematology:  Lab Results   Component Value Date    WBC 12.25 (H) 08/09/2023    HGB 14.8 08/09/2023    HCT 43.3 08/09/2023     08/09/2023     Chemistry:  Lab Results   Component Value Date     12/20/2023    K 3.7 12/20/2023    CHLORIDE 106 12/20/2023    BUN 12.8 12/20/2023    CREATININE 0.98 12/20/2023    EGFRNORACEVR >60 12/20/2023    GLUCOSE 98 12/20/2023    CALCIUM 10.5 (H) 12/20/2023    ALKPHOS 75 12/20/2023    LABPROT 8.2 12/20/2023    ALBUMIN 4.6 12/20/2023    BILIDIR 0.1 08/19/2021    IBILI 0.30 08/19/2021    AST 23 12/20/2023    ALT 25 12/20/2023    EQUTGUKK74VO 32.6 08/09/2023     Lab Results   Component Value Date    HGBA1C 6.0 12/20/2023     Lipid Panel:  Lab Results   Component Value Date    CHOL 197 08/09/2023    HDL 47 08/09/2023    .00 08/09/2023    TRIG 233 (H) 08/09/2023    TOTALCHOLEST 4 08/09/2023     Thyroid:  Lab Results   Component Value Date    TSH 0.957 08/09/2023     Urine:  Lab Results   Component Value Date    COLORUA Colorless (A) 06/21/2023    APPEARANCEUA Clear 06/21/2023    SGUA 1.004 06/21/2023    PHUA 6.0 06/21/2023    PROTEINUA Negative 06/21/2023    GLUCOSEUA 2+ (A) 06/21/2023    KETONESUA Negative 06/21/2023    BLOODUA 2+ (A) 06/21/2023    NITRITESUA Negative 06/21/2023    LEUKOCYTESUR Negative 06/21/2023    RBCUA 6-10 (A) 06/21/2023    WBCUA None Seen 06/21/2023    BACTERIA None  Seen 06/21/2023    SQEPUA Trace (A) 06/21/2023    HYALINECASTS None Seen 06/21/2023    CREATRANDUR 47.7 (L) 08/09/2023        Assessment:       ICD-10-CM ICD-9-CM   1. Class 1 obesity due to excess calories with serious comorbidity and body mass index (BMI) of 32.0 to 32.9 in adult  E66.09 278.00    Z68.32 V85.32   2. Chest pain, unspecified type  R07.9 786.50   3. Type 2 diabetes mellitus with hyperglycemia, without long-term current use of insulin  E11.65 250.00     790.29   4. Primary hypertension  I10 401.9   5. Mixed hyperlipidemia  E78.2 272.2   6. Asymptomatic microscopic hematuria  R31.21 599.72   7. Cigarette nicotine dependence without complication  F17.210 305.1       Plan:     1. Chest pain, unspecified type  - Ambulatory referral/consult to Cardiology; Future    2. Type 2 diabetes mellitus with hyperglycemia, without long-term current use of insulin  Continue Jardiance 10 mg daily  Encouraged home CBG monitoring.  Hypoglycemic episodes: denies  Body mass index is 32.7 kg/m².  Hemoglobin A1c   Date Value Ref Range Status   12/20/2023 6.0 <=7.0 % Final     Results for orders placed or performed in visit on 08/09/23   Microalbumin/Creatinine Ratio, Urine   Result Value Ref Range    Urine Microalbumin 155.3 (H) <=30.0 ug/ml    Urine Creatinine 47.7 (L) 63.0 - 166.0 mg/dL    Microalbumin Creatinine Ratio 325.6 (H) 0.0 - 30.0 mg/gm Cr   No Statin  No ACE/ARB  Weight Loss Encouraged  ADA Diet  - CBC Auto Differential; Future  - Comprehensive Metabolic Panel; Future  - Hemoglobin A1C; Future  - Lipid Panel; Future  - Urinalysis; Future  - Microalbumin/Creatinine Ratio, Urine; Future    3. Primary hypertension  Vitals:    03/14/24 0851   BP: 137/80   Pulse: 68   Resp: 18   Temp: 98.3 °F (36.8 °C)      Results for orders placed or performed in visit on 08/09/23   Microalbumin/Creatinine Ratio, Urine   Result Value Ref Range    Urine Microalbumin 155.3 (H) <=30.0 ug/ml    Urine Creatinine 47.7 (L) 63.0 - 166.0  mg/dL    Microalbumin Creatinine Ratio 325.6 (H) 0.0 - 30.0 mg/gm Cr     Results for orders placed or performed during the hospital encounter of 06/21/23   EKG 12-lead (Chest Pain) Age >30    Collection Time: 06/21/23 11:34 AM    Narrative    Test Reason : R07.9,    Vent. Rate : 068 BPM     Atrial Rate : 068 BPM     P-R Int : 156 ms          QRS Dur : 102 ms      QT Int : 366 ms       P-R-T Axes : 082 083 065 degrees     QTc Int : 389 ms    Normal sinus rhythm  Normal ECG    Confirmed by Eleazar Nassar MD ([id number]) on 6/21/2023 3:30:22 PM    Referred By:             Confirmed By:Eleazar Nassar MD   Continue HCTZ 25 mg daily  Increase Amlodipine to 10 mg daily  DASH diet  Encouraged home blood pressure monitoring  - TSH; Future    4. Mixed hyperlipidemia  Patient refuses medications  Educated on importance of lowering cholesterol (LDL) level with Dx of DM2 and HTN. Patient states understanding but states he doesn't want more medication.   Weight loss encouraged  Low fat/high fiber diet  Increase physical activity  Tobacco cessation encouraged  Cholesterol Total   Date Value Ref Range Status   08/09/2023 197 <=200 mg/dL Final     HDL Cholesterol   Date Value Ref Range Status   08/09/2023 47 35 - 60 mg/dL Final     Triglyceride   Date Value Ref Range Status   08/09/2023 233 (H) 34 - 140 mg/dL Final     LDL Cholesterol   Date Value Ref Range Status   08/09/2023 103.00 50.00 - 140.00 mg/dL Final     5. Asymptomatic microscopic hematuria  UA ordered    6. Class 1 obesity due to excess calories with serious comorbidity and body mass index (BMI) of 32.0 to 32.9 in adult  Body mass index is 32.7 kg/m².  TSH   Date Value Ref Range Status   08/09/2023 0.957 0.350 - 4.940 uIU/mL Final     Vit D 25 OH   Date Value Ref Range Status   08/09/2023 32.6 30.0 - 80.0 ng/mL Final     Hemoglobin A1c   Date Value Ref Range Status   12/20/2023 6.0 <=7.0 % Final   Sleep Study: none noted  Weight Loss Encouraged  Increase Physical Activity  - Vitamin D;  Future    7. Cigarette nicotine dependence without complication  Smoking cessation education provided, encouraged. Informed of smoking cessation program. Patient refused smoking cessation at this time. I spent 3 minutes discussing smoking cessation with patient.       Follow up in about 6 months (around 9/14/2024) for Labs. In addition to their scheduled follow up, the patient has also been instructed to follow up on as needed basis.     Ruth Dunlap, IRMAP

## 2024-04-10 ENCOUNTER — TELEPHONE (OUTPATIENT)
Dept: CARDIOLOGY | Facility: CLINIC | Age: 59
End: 2024-04-10
Payer: MEDICAID

## 2024-04-11 ENCOUNTER — HOSPITAL ENCOUNTER (EMERGENCY)
Facility: HOSPITAL | Age: 59
Discharge: HOME | End: 2024-04-11
Attending: INTERNAL MEDICINE
Payer: MEDICAID

## 2024-04-11 VITALS
OXYGEN SATURATION: 100 % | BODY MASS INDEX: 31.12 KG/M2 | DIASTOLIC BLOOD PRESSURE: 77 MMHG | HEART RATE: 70 BPM | RESPIRATION RATE: 14 BRPM | HEIGHT: 73 IN | SYSTOLIC BLOOD PRESSURE: 187 MMHG | WEIGHT: 234.81 LBS | TEMPERATURE: 99 F

## 2024-04-11 DIAGNOSIS — R07.89 ATYPICAL CHEST PAIN: Primary | ICD-10-CM

## 2024-04-11 DIAGNOSIS — R07.9 CHEST PAIN: ICD-10-CM

## 2024-04-11 DIAGNOSIS — I10 UNCONTROLLED HYPERTENSION: ICD-10-CM

## 2024-04-11 LAB
ALBUMIN SERPL-MCNC: 4.3 G/DL (ref 3.5–5)
ALBUMIN/GLOB SERPL: 1.2 RATIO (ref 1.1–2)
ALP SERPL-CCNC: 73 UNIT/L (ref 40–150)
ALT SERPL-CCNC: 22 UNIT/L (ref 0–55)
AST SERPL-CCNC: 19 UNIT/L (ref 5–34)
BASOPHILS # BLD AUTO: 0.07 X10(3)/MCL
BASOPHILS NFR BLD AUTO: 1 %
BILIRUB SERPL-MCNC: 0.4 MG/DL
BNP BLD-MCNC: 28.7 PG/ML
BUN SERPL-MCNC: 14.2 MG/DL (ref 8.4–25.7)
CALCIUM SERPL-MCNC: 10.2 MG/DL (ref 8.4–10.2)
CHLORIDE SERPL-SCNC: 105 MMOL/L (ref 98–107)
CO2 SERPL-SCNC: 27 MMOL/L (ref 22–29)
CREAT SERPL-MCNC: 1 MG/DL (ref 0.73–1.18)
EOSINOPHIL # BLD AUTO: 0.08 X10(3)/MCL (ref 0–0.9)
EOSINOPHIL NFR BLD AUTO: 1.2 %
ERYTHROCYTE [DISTWIDTH] IN BLOOD BY AUTOMATED COUNT: 12.6 % (ref 11.5–17)
GFR SERPLBLD CREATININE-BSD FMLA CKD-EPI: >60 MLS/MIN/1.73/M2
GLOBULIN SER-MCNC: 3.7 GM/DL (ref 2.4–3.5)
GLUCOSE SERPL-MCNC: 140 MG/DL (ref 74–100)
HCT VFR BLD AUTO: 47.7 % (ref 42–52)
HGB BLD-MCNC: 16.7 G/DL (ref 14–18)
HOLD SPECIMEN: NORMAL
HOLD SPECIMEN: NORMAL
IMM GRANULOCYTES # BLD AUTO: 0.02 X10(3)/MCL (ref 0–0.04)
IMM GRANULOCYTES NFR BLD AUTO: 0.3 %
LYMPHOCYTES # BLD AUTO: 2.37 X10(3)/MCL (ref 0.6–4.6)
LYMPHOCYTES NFR BLD AUTO: 34.3 %
MCH RBC QN AUTO: 29.7 PG (ref 27–31)
MCHC RBC AUTO-ENTMCNC: 35 G/DL (ref 33–36)
MCV RBC AUTO: 84.7 FL (ref 80–94)
MONOCYTES # BLD AUTO: 0.49 X10(3)/MCL (ref 0.1–1.3)
MONOCYTES NFR BLD AUTO: 7.1 %
NEUTROPHILS # BLD AUTO: 3.87 X10(3)/MCL (ref 2.1–9.2)
NEUTROPHILS NFR BLD AUTO: 56.1 %
NRBC BLD AUTO-RTO: 0 %
PLATELET # BLD AUTO: 254 X10(3)/MCL (ref 130–400)
PMV BLD AUTO: 10.6 FL (ref 7.4–10.4)
POTASSIUM SERPL-SCNC: 3.5 MMOL/L (ref 3.5–5.1)
PROT SERPL-MCNC: 8 GM/DL (ref 6.4–8.3)
RBC # BLD AUTO: 5.63 X10(6)/MCL (ref 4.7–6.1)
SODIUM SERPL-SCNC: 140 MMOL/L (ref 136–145)
TROPONIN I SERPL-MCNC: <0.01 NG/ML (ref 0–0.04)
WBC # SPEC AUTO: 6.9 X10(3)/MCL (ref 4.5–11.5)

## 2024-04-11 PROCEDURE — 99285 EMERGENCY DEPT VISIT HI MDM: CPT | Mod: 25

## 2024-04-11 PROCEDURE — 83880 ASSAY OF NATRIURETIC PEPTIDE: CPT

## 2024-04-11 PROCEDURE — 84484 ASSAY OF TROPONIN QUANT: CPT

## 2024-04-11 PROCEDURE — 80053 COMPREHEN METABOLIC PANEL: CPT

## 2024-04-11 PROCEDURE — 93005 ELECTROCARDIOGRAM TRACING: CPT

## 2024-04-11 PROCEDURE — 85025 COMPLETE CBC W/AUTO DIFF WBC: CPT

## 2024-04-11 RX ORDER — LOSARTAN POTASSIUM 25 MG/1
25 TABLET ORAL DAILY
Qty: 90 TABLET | Refills: 1 | Status: SHIPPED | OUTPATIENT
Start: 2024-04-11 | End: 2025-04-11

## 2024-04-11 RX ORDER — ACETAMINOPHEN 325 MG/1
650 TABLET ORAL EVERY 8 HOURS PRN
Qty: 30 TABLET | Refills: 0 | Status: SHIPPED | OUTPATIENT
Start: 2024-04-11

## 2024-04-11 NOTE — ED PROVIDER NOTES
Encounter Date: 4/11/2024       History     Chief Complaint   Patient presents with    Chest Pain     Right sided chest pain x 3 days with cough. Increased pain with coughing or movement.     Patient is 58-year-old man with past medical history hypertension, hyperlipidemia, sleep apnea, tobacco use, and diabetes mellitus who presents to the ED due to right sided chest pain rated 10/10 described as sharp in nature that started 3 days ago.  Pain occasionally radiates from right-sided chest down to right side of abdomen.  Patient states he has not had chest pain like this before which is why he is concerned.  Patient has had a cough last 3 days which when he coughs makes the chest pain worse.  Some positions make patient's chest pain better, chest pain is not reproducible with palpation.  Chest pain is not worsened by exertion.  Patient has tried home medication and BC powder to relieve chest pain which has not helped.        Review of patient's allergies indicates:  No Known Allergies  Past Medical History:   Diagnosis Date    Diabetes mellitus     GERD (gastroesophageal reflux disease)     Hypertension      Past Surgical History:   Procedure Laterality Date    COLONOSCOPY W/ POLYPECTOMY  01/19/2021     Family History   Problem Relation Age of Onset    Hypertension Mother     Hypertension Father     Aneurysm Father      Social History     Tobacco Use    Smoking status: Every Day     Types: Cigarettes     Start date: 5/1/1981    Smokeless tobacco: Former   Substance Use Topics    Alcohol use: Never    Drug use: Not Currently     Frequency: 7.0 times per week     Types: Marijuana     Review of Systems   Constitutional:  Negative for fatigue and fever.   HENT:  Negative for congestion, ear pain and sore throat.    Respiratory:  Positive for cough (Did not observe patient coughing in ED). Negative for shortness of breath.    Cardiovascular:  Positive for chest pain. Negative for palpitations and leg swelling.    Gastrointestinal:  Negative for abdominal distention, abdominal pain, constipation, diarrhea, nausea and vomiting.   Genitourinary:  Negative for dysuria.   Neurological:  Negative for dizziness, light-headedness and headaches.       Physical Exam     Initial Vitals [04/11/24 0809]   BP Pulse Resp Temp SpO2   (!) 178/82 81 14 98.7 °F (37.1 °C) 100 %      MAP       --         Physical Exam    Constitutional: He appears well-developed. He is not diaphoretic. No distress.   HENT:   Head: Normocephalic and atraumatic.   Cardiovascular:  Normal rate, regular rhythm and normal heart sounds.     Exam reveals no gallop and no friction rub.       No murmur heard.  Pulmonary/Chest: Breath sounds normal. No respiratory distress. He has no wheezes.   Abdominal: Abdomen is soft. He exhibits no distension. There is no abdominal tenderness. There is no rebound and no guarding.     Neurological: He is alert and oriented to person, place, and time. GCS score is 15. GCS eye subscore is 4. GCS verbal subscore is 5. GCS motor subscore is 6.   Skin: Skin is warm and dry.   Psychiatric: He has a normal mood and affect.         ED Course   Procedures  Labs Reviewed   COMPREHENSIVE METABOLIC PANEL - Abnormal; Notable for the following components:       Result Value    Glucose Level 140 (*)     Globulin 3.7 (*)     All other components within normal limits   CBC WITH DIFFERENTIAL - Abnormal; Notable for the following components:    MPV 10.6 (*)     All other components within normal limits   TROPONIN I - Normal   B-TYPE NATRIURETIC PEPTIDE - Normal   CBC W/ AUTO DIFFERENTIAL    Narrative:     The following orders were created for panel order CBC auto differential.  Procedure                               Abnormality         Status                     ---------                               -----------         ------                     CBC with Differential[[id number]]        Abnormal            Final result                 Please view  results for these tests on the individual orders.   EXTRA TUBES    Narrative:     The following orders were created for panel order EXTRA TUBES.  Procedure                               Abnormality         Status                     ---------                               -----------         ------                     Light Blue Top Hold[[id number]]                             In process                 Gold Top Hold[[id number]]                                   In process                   Please view results for these tests on the individual orders.   LIGHT BLUE TOP HOLD   GOLD TOP HOLD   TROPONIN I        ECG Results              EKG 12-lead (Chest Pain) Age >30 (In process)        Collection Time Result Time QRS Duration [facility] QTC Calculation    04/11/24 08:11:30 04/11/24 08:15:31 106 401                     In process by Interface, Lab In [facility] (04/11/24 08:15:40)                   Narrative:    Test Reason : R07.9,    Vent. Rate : 070 BPM     Atrial Rate : 070 BPM     P-R Int : 158 ms          QRS Dur : 106 ms      QT Int : 372 ms       P-R-T Axes : 077 078 050 degrees     QTc Int : 401 ms    Normal sinus rhythm  Normal ECG  When compared with ECG of 21-JUN-2023 11:34,  No significant change was found    Referred By:             Confirmed By:                                   Imaging Results              X-Ray Chest PA And Lateral (Final result)  Result time 04/11/24 09:36:44      Final result by Juvencio Whyte MD (04/11/24 09:36:44)                   Impression:      No acute chest disease is identified.      Electronically signed by: Juvencio Whyte  Date:    04/11/2024  Time:    09:36               Narrative:    EXAMINATION:  XR CHEST PA AND LATERAL    CLINICAL HISTORY:  , Chest pain, unspecified.    COMPARISON:  June 21, 2023    FINDINGS:  No alveolar consolidation, effusion, or pneumothorax is seen.   The thoracic aorta is normal  cardiac silhouette, central pulmonary vessels and mediastinum are  normal in size and are grossly unremarkable.  Tiny nodularity is identified in the right and left base likely reflect nipple shadows                                       Medications - No data to display  Medical Decision Making  Patient is 58-year-old man with past medical history hypertension, hyperlipidemia, sleep apnea, tobacco use, and diabetes mellitus who presents to the ED due to right sided chest pain rated 10/10 described as sharp in nature that started 3 days ago.  EKG, troponin, cxray and BNP were ordered to evaluate patient's cause of chest pain.  EKG showed normal sinus rhythm.  Chest x-ray showed no acute chest disease.  BNP and troponin are WNL.  Will discharge patient with acetaminophen 650 to be used Q8H p.r.n. for pain. Patient's blood pressure elevated during ED visit, patient states compliance with amlodipine and thiazide.  Will add losartan 25 mg as additional blood pressure control agent, will defer final blood pressure management to PCP.  Patient hemodynamically stable at time of discharge.  Instructed patient to return to ED if chest pain changes or worsens.    Amount and/or Complexity of Data Reviewed  Labs: ordered. Decision-making details documented in ED Course.  Radiology: ordered and independent interpretation performed. Decision-making details documented in ED Course.  ECG/medicine tests: ordered and independent interpretation performed. Decision-making details documented in ED Course.    Risk  OTC drugs.  Prescription drug management.      Additional MDM:   Differential Diagnosis:   Symptom: Chest pain. <> The follow diagnoses were considered and will be evaluated: Atypical Chest Pain, Chest Pain, Chest Wall Pain and GERD.             Attending Attestation:   Physician Attestation Statement for Resident:  As the supervising MD   Physician Attestation Statement: I have personally seen and examined this patient.   I agree with the above history.  -:   As the supervising MD I agree with  the above PE.     As the supervising MD I agree with the above treatment, course, plan, and disposition.    I have reviewed and agree with the residents interpretation of the following: lab data, x-rays and EKG.  I have reviewed the following: old records at this facility.                                       Clinical Impression:  Final diagnoses:  [R07.9] Chest pain  [I10] Uncontrolled hypertension  [R07.89] Atypical chest pain (Primary)          ED Disposition Condition    Discharge Stable          ED Prescriptions       Medication Sig Dispense Start Date End Date Auth. Provider    losartan (COZAAR) 25 MG tablet Take 1 tablet (25 mg total) by mouth once daily. 90 tablet 4/11/2024 4/11/2025 Varinder Camarillo MD    acetaminophen (TYLENOL) 325 MG tablet Take 2 tablets (650 mg total) by mouth every 8 (eight) hours as needed for Pain. 30 tablet 4/11/2024 -- Varinder Camarillo MD          Follow-up Information       Follow up With Specialties Details Why Contact Info    Ruth Dunlap FNP Family Medicine Call in 1 day  [address] [facility] [address]  [phone]      [facility] Emergency Medicine  If symptoms worsen [address], [place] [id number] [phone]             Varinder Camarillo MD  Resident  04/11/24 0955       Varinder Camarillo MD  Resident  04/11/24 1037       Varinder Camarillo MD  Resident  04/11/24 1039       Ervin Buckley MD  04/11/24 1933

## 2024-04-12 LAB
OHS QRS DURATION: 106 MS
OHS QTC CALCULATION: 401 MS

## 2024-05-02 ENCOUNTER — HOSPITAL ENCOUNTER (EMERGENCY)
Facility: HOSPITAL | Age: 59
Discharge: HOME | End: 2024-05-02
Attending: FAMILY MEDICINE
Payer: MEDICAID

## 2024-05-02 VITALS
BODY MASS INDEX: 29.91 KG/M2 | HEART RATE: 57 BPM | TEMPERATURE: 99 F | RESPIRATION RATE: 18 BRPM | HEIGHT: 73 IN | SYSTOLIC BLOOD PRESSURE: 143 MMHG | DIASTOLIC BLOOD PRESSURE: 81 MMHG | WEIGHT: 225.69 LBS | OXYGEN SATURATION: 98 %

## 2024-05-02 DIAGNOSIS — R52 PAIN: ICD-10-CM

## 2024-05-02 DIAGNOSIS — M10.9 GOUT, UNSPECIFIED CAUSE, UNSPECIFIED CHRONICITY, UNSPECIFIED SITE: Primary | ICD-10-CM

## 2024-05-02 PROCEDURE — 63600175 PHARM REV CODE 636 W HCPCS: Performed by: FAMILY MEDICINE

## 2024-05-02 PROCEDURE — 96372 THER/PROPH/DIAG INJ SC/IM: CPT | Performed by: FAMILY MEDICINE

## 2024-05-02 PROCEDURE — 99284 EMERGENCY DEPT VISIT MOD MDM: CPT | Mod: 25

## 2024-05-02 RX ORDER — PREDNISONE 50 MG/1
50 TABLET ORAL DAILY
Qty: 5 TABLET | Refills: 0 | Status: SHIPPED | OUTPATIENT
Start: 2024-05-02

## 2024-05-02 RX ORDER — DEXAMETHASONE SODIUM PHOSPHATE 4 MG/ML
8 INJECTION, SOLUTION INTRA-ARTICULAR; INTRALESIONAL; INTRAMUSCULAR; INTRAVENOUS; SOFT TISSUE
Status: COMPLETED | OUTPATIENT
Start: 2024-05-02 | End: 2024-05-02

## 2024-05-02 RX ORDER — KETOROLAC TROMETHAMINE 30 MG/ML
60 INJECTION, SOLUTION INTRAMUSCULAR; INTRAVENOUS
Status: COMPLETED | OUTPATIENT
Start: 2024-05-02 | End: 2024-05-02

## 2024-05-02 RX ADMIN — DEXAMETHASONE SODIUM PHOSPHATE 8 MG: 4 INJECTION, SOLUTION INTRA-ARTICULAR; INTRALESIONAL; INTRAMUSCULAR; INTRAVENOUS; SOFT TISSUE at 11:05

## 2024-05-02 RX ADMIN — KETOROLAC TROMETHAMINE 60 MG: 30 INJECTION, SOLUTION INTRAMUSCULAR at 11:05

## 2024-05-02 NOTE — ED PROVIDER NOTES
"Encounter Date: 5/2/2024       History     Chief Complaint   Patient presents with    Foot Pain     States "I have the gout". Complains of right foot pain     Foot pain   Patient comes in with right foot pain resulting in patient feeling like his gout has reoccurred patient otherwise has no other injuries does have a chronic history of reoccurring gout that is possibly contributing to today's episode        Review of patient's allergies indicates:  No Known Allergies  Past Medical History:   Diagnosis Date    Diabetes mellitus     GERD (gastroesophageal reflux disease)     Hypertension      Past Surgical History:   Procedure Laterality Date    COLONOSCOPY W/ POLYPECTOMY  01/19/2021     Family History   Problem Relation Name Age of Onset    Hypertension Mother      Hypertension Father      Aneurysm Father       Social History     Tobacco Use    Smoking status: Every Day     Types: Cigarettes     Start date: 5/1/1981    Smokeless tobacco: Former   Substance Use Topics    Alcohol use: Never    Drug use: Not Currently     Frequency: 7.0 times per week     Types: Marijuana     Review of Systems   Constitutional:  Negative for fever.   HENT:  Negative for sore throat.    Respiratory:  Negative for shortness of breath.    Cardiovascular:  Negative for chest pain.   Gastrointestinal:  Negative for nausea.   Genitourinary:  Negative for dysuria.   Musculoskeletal:  Positive for arthralgias and myalgias. Negative for back pain.   Skin:  Negative for rash.   Neurological:  Negative for weakness.   Hematological:  Does not bruise/bleed easily.   All other systems reviewed and are negative.      Physical Exam     Initial Vitals [05/02/24 1053]   BP Pulse Resp Temp SpO2   (!) 143/81 (!) 57 18 98.7 °F (37.1 °C) 98 %      MAP       --         Physical Exam    Nursing note and vitals reviewed.  Constitutional: He appears well-developed and well-nourished. He is not diaphoretic. No distress.   HENT:   Head: Normocephalic and " atraumatic.   Right Ear: External ear normal.   Left Ear: External ear normal.   Nose: Nose normal.   Mouth/Throat: Oropharynx is clear and moist. No oropharyngeal exudate.   Eyes: Conjunctivae and EOM are normal. Pupils are equal, round, and reactive to light. Right eye exhibits no discharge. Left eye exhibits no discharge.   Neck: Neck supple. No thyromegaly present. No tracheal deviation present. No JVD present.   Normal range of motion.  Cardiovascular:  Normal rate, regular rhythm, normal heart sounds and intact distal pulses.     Exam reveals no gallop and no friction rub.       No murmur heard.  Pulmonary/Chest: Breath sounds normal. No stridor. No respiratory distress. He has no wheezes. He has no rhonchi. He has no rales. He exhibits no tenderness.   Abdominal: Abdomen is soft. Bowel sounds are normal. He exhibits no distension. There is no abdominal tenderness. There is no rebound and no guarding.   Musculoskeletal:         General: Tenderness present. No edema. Normal range of motion.      Cervical back: Normal range of motion and neck supple.     Lymphadenopathy:     He has no cervical adenopathy.   Neurological: He is alert and oriented to person, place, and time. He has normal strength and normal reflexes. No cranial nerve deficit or sensory deficit. GCS score is 15. GCS eye subscore is 4. GCS verbal subscore is 5. GCS motor subscore is 6.   Skin: Skin is warm and dry. No rash and no abscess noted. No erythema.   Psychiatric: He has a normal mood and affect. His behavior is normal. Judgment and thought content normal.         ED Course   Procedures  Labs Reviewed - No data to display       Imaging Results              X-Ray Foot Complete Right (Final result)  Result time 05/02/24 11:48:19      Final result by Juvencio Whyte MD (05/02/24 11:48:19)                   Impression:      Minimal degenerative changes.      Electronically signed by: Juvencio Whyte  Date:    05/02/2024  Time:    11:48                Narrative:    EXAMINATION:  XR FOOT COMPLETE 3 VIEW RIGHT    CLINICAL HISTORY:  Pain, unspecified    COMPARISON:  None.    FINDINGS:  No acute displaced fractures or dislocations.    There are minimal degenerative changes of the interphalangeal joints articular spaces are otherwise preserved with smooth articular surfaces    No blastic or lytic lesions.    Soft tissues within normal limits.                                       Medications   ketorolac injection 60 mg (has no administration in time range)   dexAMETHasone injection 8 mg (has no administration in time range)     Medical Decision Making  Gout injury fracture contusion    Amount and/or Complexity of Data Reviewed  Radiology: ordered.    Risk  Prescription drug management.                                      Clinical Impression:  Final diagnoses:  [R52] Pain  [M10.9] Gout, unspecified cause, unspecified chronicity, unspecified site (Primary)          ED Disposition Condition    Discharge Stable          ED Prescriptions       Medication Sig Dispense Start Date End Date Auth. Provider    predniSONE (DELTASONE) 50 MG Tab Take 1 tablet (50 mg total) by mouth once daily. 5 tablet 5/2/2024 -- Francisco Javier Otero MD          Follow-up Information       Follow up With Specialties Details Why Contact Info    [facility]   [address] [facility] [address]  [phone]               Francisco Javier Otero MD  05/02/24 0879

## 2024-06-10 ENCOUNTER — TELEPHONE (OUTPATIENT)
Dept: INTERNAL MEDICINE | Facility: CLINIC | Age: 59
End: 2024-06-10
Payer: MEDICAID

## 2024-06-10 ENCOUNTER — OFFICE VISIT (OUTPATIENT)
Dept: INTERNAL MEDICINE | Facility: CLINIC | Age: 59
End: 2024-06-10
Payer: MEDICAID

## 2024-06-10 VITALS
OXYGEN SATURATION: 99 % | RESPIRATION RATE: 18 BRPM | BODY MASS INDEX: 30.19 KG/M2 | DIASTOLIC BLOOD PRESSURE: 79 MMHG | WEIGHT: 227.81 LBS | HEIGHT: 73 IN | SYSTOLIC BLOOD PRESSURE: 135 MMHG | TEMPERATURE: 99 F | HEART RATE: 68 BPM

## 2024-06-10 DIAGNOSIS — K59.00 CONSTIPATION, UNSPECIFIED CONSTIPATION TYPE: ICD-10-CM

## 2024-06-10 DIAGNOSIS — F17.210 CIGARETTE NICOTINE DEPENDENCE WITHOUT COMPLICATION: Chronic | ICD-10-CM

## 2024-06-10 DIAGNOSIS — I10 PRIMARY HYPERTENSION: Chronic | ICD-10-CM

## 2024-06-10 DIAGNOSIS — E66.01 CLASS 2 SEVERE OBESITY DUE TO EXCESS CALORIES WITH SERIOUS COMORBIDITY AND BODY MASS INDEX (BMI) OF 36.0 TO 36.9 IN ADULT: Chronic | ICD-10-CM

## 2024-06-10 DIAGNOSIS — K92.1 HEMATOCHEZIA: Primary | ICD-10-CM

## 2024-06-10 PROCEDURE — 99214 OFFICE O/P EST MOD 30 MIN: CPT | Mod: S$PBB,,, | Performed by: NURSE PRACTITIONER

## 2024-06-10 PROCEDURE — 3008F BODY MASS INDEX DOCD: CPT | Mod: CPTII,,, | Performed by: NURSE PRACTITIONER

## 2024-06-10 PROCEDURE — 4010F ACE/ARB THERAPY RXD/TAKEN: CPT | Mod: CPTII,,, | Performed by: NURSE PRACTITIONER

## 2024-06-10 PROCEDURE — 1159F MED LIST DOCD IN RCRD: CPT | Mod: CPTII,,, | Performed by: NURSE PRACTITIONER

## 2024-06-10 PROCEDURE — 1160F RVW MEDS BY RX/DR IN RCRD: CPT | Mod: CPTII,,, | Performed by: NURSE PRACTITIONER

## 2024-06-10 PROCEDURE — 99215 OFFICE O/P EST HI 40 MIN: CPT | Mod: PBBFAC | Performed by: NURSE PRACTITIONER

## 2024-06-10 PROCEDURE — 3078F DIAST BP <80 MM HG: CPT | Mod: CPTII,,, | Performed by: NURSE PRACTITIONER

## 2024-06-10 PROCEDURE — 3075F SYST BP GE 130 - 139MM HG: CPT | Mod: CPTII,,, | Performed by: NURSE PRACTITIONER

## 2024-06-10 RX ORDER — POLYETHYLENE GLYCOL 3350 17 G/17G
17 POWDER, FOR SOLUTION ORAL DAILY PRN
Qty: 289 G | Refills: 1 | Status: SHIPPED | OUTPATIENT
Start: 2024-06-10

## 2024-06-10 NOTE — PROGRESS NOTES
Patient ID: [id number]     Chief Complaint: Rectal Bleeding    HPI:     Wilfrido Henriquez is a 58 y.o. male with diagnosis of HTN, HLD, Gout, Sleep Apnea, Tobacco Use. Patient seen in clinic today for rectal bleeding. Patient last seen in clinic on 2024.   Patient states he had rectal bleeding on 2024 that last 3 days then spontaneously resolved. Patient denies abdominal pain, N/V/D. Patient states he does have constipation at times. Denies medication for constipation. Patient had colonoscopy completed 2021 with Dr. Lombardi, no report noted, will request.   Patient currently prescribed Jardiance 10 mg daily for DM2. Previously, Metformin discontinued due to rash. Previous A1c was 6.0.  Patient states he does not check in blood sugars at home, was prescribed a glucometer states he does not know how to use it in forgot to bring it to clinic for instruction.  Patient currently prescribed Amlodipine 10 mg daily, HCTZ 25 mg daily, Losartan 25 mg daily for HTN. Patient denies chest pain, SOB. Patient had EKG completed 2023 that was normal.    Patient denies any other acute complaints.     Review of patient's allergies indicates:  No Known Allergies    Breast Cancer Screening: N/A  Cervical Cancer Screening: N/A  Colorectal Cancer Screening: Colonoscopy on 2021 with Dr. Lombardi, will try to obtain report  Diabetic Eye Exam: Ophthalmology referral ordered  Diabetic Foot Exam: 2024  Lung Cancer Screening: ordered  Prostate Cancer Screenin.5 on 2022  AAA Screening: deferred due to age  Osteoporosis Screening: N/A  Medicare Wellness: N/A  Immunizations:   Immunization History   Administered Date(s) Administered    COVID-19, MRNA, LN-S, PF (MODERNA FULL 0.5 ML DOSE) 08/15/2021, 2021    Influenza - Trivalent - PF (ADULT) 2015     Past Surgical History:   Procedure Laterality Date    COLONOSCOPY W/ POLYPECTOMY  2021     family history includes Aneurysm in his father;  Hypertension in his father and mother.    Social History     Socioeconomic History    Marital status: Single   Occupational History    Occupation: unemployed   Tobacco Use    Smoking status: Some Days     Types: Cigarettes     Start date: 5/1/1981    Smokeless tobacco: Former   Substance and Sexual Activity    Alcohol use: Never    Drug use: Not Currently     Frequency: 7.0 times per week     Types: Marijuana    Sexual activity: Yes     Partners: Female     Social Determinants of Health     Financial Resource Strain: High Risk (3/14/2024)    Overall Financial Resource Strain (CARDIA)     Difficulty of Paying Living Expenses: Hard   Food Insecurity: Food Insecurity Present (3/14/2024)    Hunger Vital Sign     Worried About Running Out of Food in the Last Year: Sometimes true     Ran Out of Food in the Last Year: Never true   Transportation Needs: Unmet Transportation Needs (3/14/2024)    PRAPARE - Transportation     Lack of Transportation (Medical): Yes     Lack of Transportation (Non-Medical): Yes   Physical Activity: Inactive (3/14/2024)    Exercise Vital Sign     Days of Exercise per Week: 0 days     Minutes of Exercise per Session: 0 min   Stress: No Stress Concern Present (3/14/2024)    [employer] - Occupational Stress Questionnaire     Feeling of Stress : Only a little   Housing Stability: Unknown (3/14/2024)    Housing Stability Vital Sign     Unable to Pay for Housing in the Last Year: No     Number of Places Lived in the Last Year: 1     Current Outpatient Medications   Medication Instructions    acetaminophen (TYLENOL) 650 mg, Oral, Every 8 hours PRN    allopurinoL (ZYLOPRIM) 100 mg, Oral, Daily    amLODIPine (NORVASC) 10 mg, Oral, Daily    blood sugar diagnostic Strp 1 each, Misc.(Non-Drug; Combo Route), 2 times daily    blood-glucose meter (FREESTYLE SYSTEM KIT) kit Use as instructed    blood-glucose meter kit Check glucose 3x a day with meals    empagliflozin (JARDIANCE) 10  "mg, Oral, Daily    hydroCHLOROthiazide (HYDRODIURIL) 25 mg, Oral, Daily    lancets (ONETOUCH ULTRASOFT LANCETS) Misc 1 Box , Misc.(Non-Drug; Combo Route), 2 times daily    losartan (COZAAR) 25 mg, Oral, Daily    polyethylene glycol (GLYCOLAX) 17 g, Oral, Daily PRN    predniSONE (DELTASONE) 50 mg, Oral, Daily       Subjective:     Review of Systems   Constitutional: Negative.    HENT: Negative.     Eyes: Negative.    Respiratory: Negative.     Cardiovascular: Negative.    Gastrointestinal:  Positive for blood in stool and constipation.   Endocrine: Negative.    Genitourinary: Negative.    Musculoskeletal: Negative.    Skin: Negative.    Allergic/Immunologic: Negative.    Neurological: Negative.    Hematological: Negative.    Psychiatric/Behavioral: Negative.         Objective:     Visit Vitals  /79 (BP Location: Right arm, Patient Position: Sitting, BP Method: Medium (Automatic))   Pulse 68   Temp 98.7 °F (37.1 °C) (Oral)   Resp 18   Ht 6' 1" (1.854 m)   Wt 103.3 kg (227 lb 12.8 oz)   SpO2 99%   BMI 30.05 kg/m²       Physical Exam  Vitals reviewed.   Constitutional:       Appearance: Normal appearance. He is obese.   HENT:      Head: Normocephalic and atraumatic.      Mouth/Throat:      Mouth: Mucous membranes are moist.      Pharynx: Oropharynx is clear.   Eyes:      Extraocular Movements: Extraocular movements intact.      Conjunctiva/sclera: Conjunctivae normal.      Pupils: Pupils are equal, round, and reactive to light.   Cardiovascular:      Rate and Rhythm: Normal rate and regular rhythm.      Heart sounds: Normal heart sounds.   Pulmonary:      Effort: Pulmonary effort is normal.      Breath sounds: Normal breath sounds.   Abdominal:      General: Bowel sounds are normal.   Musculoskeletal:         General: Normal range of motion.      Cervical back: Normal range of motion.   Skin:     General: Skin is warm and dry.   Neurological:      Mental Status: He is alert and oriented to person, place, and time. "   Psychiatric:         Mood and Affect: Mood normal.         Behavior: Behavior normal.       Labs Reviewed:     Hematology:  Lab Results   Component Value Date    WBC 6.90 04/11/2024    HGB 16.7 04/11/2024    HCT 47.7 04/11/2024     04/11/2024     Chemistry:  Lab Results   Component Value Date     04/11/2024    K 3.5 04/11/2024    BUN 14.2 04/11/2024    CREATININE 1.00 04/11/2024    EGFRNORACEVR >60 04/11/2024    GLUCOSE 140 (H) 04/11/2024    CALCIUM 10.2 04/11/2024    ALKPHOS 73 04/11/2024    LABPROT 8.0 04/11/2024    ALBUMIN 4.3 04/11/2024    BILIDIR 0.1 08/19/2021    IBILI 0.30 08/19/2021    AST 19 04/11/2024    ALT 22 04/11/2024    XBNMAHLH51XA 32.6 08/09/2023     Lab Results   Component Value Date    HGBA1C 6.0 12/20/2023     Lipid Panel:  Lab Results   Component Value Date    CHOL 197 08/09/2023    HDL 47 08/09/2023    .00 08/09/2023    TRIG 233 (H) 08/09/2023    TOTALCHOLEST 4 08/09/2023     Thyroid:  Lab Results   Component Value Date    TSH 0.957 08/09/2023     Urine:  Lab Results   Component Value Date    APPEARANCEUA Clear 06/21/2023    SGUA 1.004 06/21/2023    PROTEINUA Negative 06/21/2023    KETONESUA Negative 06/21/2023    LEUKOCYTESUR Negative 06/21/2023    RBCUA 6-10 (A) 06/21/2023    WBCUA None Seen 06/21/2023    BACTERIA None Seen 06/21/2023    SQEPUA Trace (A) 06/21/2023    HYALINECASTS None Seen 06/21/2023    CREATRANDUR 47.7 (L) 08/09/2023        Assessment:       ICD-10-CM ICD-9-CM   1. Hematochezia  K92.1 578.1   2. Constipation, unspecified constipation type  K59.00 564.00   3. Primary hypertension  I10 401.9   4. Class 2 severe obesity due to excess calories with serious comorbidity and body mass index (BMI) of 36.0 to 36.9 in adult  E66.01 278.01    Z68.36 V85.36   5. Cigarette nicotine dependence without complication  F17.210 305.1       Plan:     1. Hematochezia  FIT ordered  Previously requested Colonoscopy report from Dr. Ibanez, will request again  - OCCULT BLOOD  FECAL IMMUNOASSAY; Future  - OCCULT BLOOD FECAL IMMUNOASSAY    2. Constipation, unspecified constipation type  Start Miralax 17g daily prn    3. Primary hypertension  Vitals:    06/10/24 1313   BP: 135/79   Pulse:    Resp:    Temp:       Results for orders placed or performed in visit on 08/09/23   Microalbumin/Creatinine Ratio, Urine   Result Value Ref Range    Urine Microalbumin 155.3 (H) <=30.0 ug/ml    Urine Creatinine 47.7 (L) 63.0 - 166.0 mg/dL    Microalbumin Creatinine Ratio 325.6 (H) 0.0 - 30.0 mg/gm Cr     Results for orders placed or performed during the hospital encounter of 04/11/24   EKG 12-lead (Chest Pain) Age >30    Collection Time: 04/11/24  8:11 AM   Result Value Ref Range    QRS Duration 106 ms    [facility] QTC Calculation 401 ms    Narrative    Test Reason : R07.9,    Vent. Rate : 070 BPM     Atrial Rate : 070 BPM     P-R Int : 158 ms          QRS Dur : 106 ms      QT Int : 372 ms       P-R-T Axes : 077 078 050 degrees     QTc Int : 401 ms    Normal sinus rhythm  Normal ECG  When compared with ECG of 21-JUN-2023 11:34,  No significant change was found  Confirmed by Macho Shah MD ([id number]) on 4/12/2024 8:48:50 AM    Referred By:             Confirmed By:Macho Shah MD   Continue HCTZ 25 mg daily, Amlodipine to 10 mg daily, Losartan 25 mg daily  DASH diet  Encouraged home blood pressure monitoring    4. Class 2 severe obesity due to excess calories with serious comorbidity and body mass index (BMI) of 36.0 to 36.9 in adult  Body mass index is 30.05 kg/m².  TSH   Date Value Ref Range Status   08/09/2023 0.957 0.350 - 4.940 uIU/mL Final     Vitamin D   Date Value Ref Range Status   08/09/2023 32.6 30.0 - 80.0 ng/mL Final     Hemoglobin A1c   Date Value Ref Range Status   12/20/2023 6.0 <=7.0 % Final   Sleep Study: none noted  Weight Loss Encouraged  Increase Physical Activity    5. Cigarette nicotine dependence without complication  Smoking cessation education provided, encouraged. Informed of  smoking cessation program. Patient refused smoking cessation at this time. I spent 3 minutes discussing smoking cessation with patient.       Follow up if symptoms worsen or fail to improve. In addition to their scheduled follow up, the patient has also been instructed to follow up on as needed basis.     Ruth Dunlap, IRMAP

## 2024-06-10 NOTE — TELEPHONE ENCOUNTER
----- Message from BRADY Rivero sent at 6/10/2024  1:19 PM CDT -----  Please obtain Colonoscopy report done per Dr. Lombardi.

## 2024-06-11 ENCOUNTER — LAB VISIT (OUTPATIENT)
Dept: LAB | Facility: HOSPITAL | Age: 59
End: 2024-06-11
Attending: NURSE PRACTITIONER
Payer: MEDICAID

## 2024-06-11 DIAGNOSIS — E11.65 TYPE 2 DIABETES MELLITUS WITH HYPERGLYCEMIA, WITHOUT LONG-TERM CURRENT USE OF INSULIN: Primary | ICD-10-CM

## 2024-06-11 DIAGNOSIS — E78.2 MIXED HYPERLIPIDEMIA: ICD-10-CM

## 2024-06-11 DIAGNOSIS — I10 PRIMARY HYPERTENSION: ICD-10-CM

## 2024-06-11 LAB — OCCULT BLD IA (OHS): NEGATIVE

## 2024-06-11 PROCEDURE — 82274 ASSAY TEST FOR BLOOD FECAL: CPT

## 2024-06-18 ENCOUNTER — TELEPHONE (OUTPATIENT)
Dept: INTERNAL MEDICINE | Facility: CLINIC | Age: 59
End: 2024-06-18
Payer: MEDICAID

## 2024-06-18 DIAGNOSIS — K92.1 HEMATOCHEZIA: Primary | ICD-10-CM

## 2024-06-20 RX ORDER — ALLOPURINOL 100 MG/1
100 TABLET ORAL DAILY
Qty: 90 TABLET | Refills: 2 | Status: SHIPPED | OUTPATIENT
Start: 2024-06-20 | End: 2025-06-20

## 2024-08-19 NOTE — FIRST PROVIDER EVALUATION
Medical screening examination initiated.  I have conducted a focused provider triage encounter, findings are as follows:    Brief history of present illness:  CBG of 367 at Super 1 this AM. 255 in triage    There were no vitals filed for this visit.    Pertinent physical exam:  VSS, NAD    Brief workup plan:  BMP ordered    Preliminary workup initiated; this workup will be continued and followed by the physician or advanced practice provider that is assigned to the patient when roomed.   "I want to get better and go home."

## 2024-10-15 DIAGNOSIS — I10 PRIMARY HYPERTENSION: Primary | Chronic | ICD-10-CM

## 2024-10-22 RX ORDER — LOSARTAN POTASSIUM 25 MG/1
25 TABLET ORAL DAILY
Qty: 90 TABLET | Refills: 2 | Status: SHIPPED | OUTPATIENT
Start: 2024-10-22 | End: 2025-10-22

## 2024-11-07 ENCOUNTER — HOSPITAL ENCOUNTER (EMERGENCY)
Facility: HOSPITAL | Age: 59
Discharge: HOME | End: 2024-11-07
Attending: STUDENT IN AN ORGANIZED HEALTH CARE EDUCATION/TRAINING PROGRAM
Payer: MEDICAID

## 2024-11-07 VITALS
HEART RATE: 77 BPM | WEIGHT: 220 LBS | SYSTOLIC BLOOD PRESSURE: 160 MMHG | HEIGHT: 73 IN | TEMPERATURE: 98 F | RESPIRATION RATE: 18 BRPM | BODY MASS INDEX: 29.16 KG/M2 | DIASTOLIC BLOOD PRESSURE: 75 MMHG | OXYGEN SATURATION: 100 %

## 2024-11-07 DIAGNOSIS — M10.9 ACUTE GOUT OF RIGHT FOOT, UNSPECIFIED CAUSE: Primary | ICD-10-CM

## 2024-11-07 PROCEDURE — 99284 EMERGENCY DEPT VISIT MOD MDM: CPT | Mod: 25

## 2024-11-07 PROCEDURE — 96372 THER/PROPH/DIAG INJ SC/IM: CPT | Performed by: NURSE PRACTITIONER

## 2024-11-07 PROCEDURE — 63600175 PHARM REV CODE 636 W HCPCS: Performed by: NURSE PRACTITIONER

## 2024-11-07 RX ORDER — DEXAMETHASONE SODIUM PHOSPHATE 4 MG/ML
8 INJECTION, SOLUTION INTRA-ARTICULAR; INTRALESIONAL; INTRAMUSCULAR; INTRAVENOUS; SOFT TISSUE
Status: COMPLETED | OUTPATIENT
Start: 2024-11-07 | End: 2024-11-07

## 2024-11-07 RX ORDER — INDOMETHACIN 50 MG/1
50 CAPSULE ORAL 3 TIMES DAILY
Qty: 15 CAPSULE | Refills: 0 | Status: SHIPPED | OUTPATIENT
Start: 2024-11-07 | End: 2024-11-12

## 2024-11-07 RX ORDER — PREDNISONE 20 MG/1
40 TABLET ORAL DAILY
Qty: 10 TABLET | Refills: 0 | Status: SHIPPED | OUTPATIENT
Start: 2024-11-07 | End: 2024-11-12

## 2024-11-07 RX ORDER — KETOROLAC TROMETHAMINE 30 MG/ML
60 INJECTION, SOLUTION INTRAMUSCULAR; INTRAVENOUS
Status: COMPLETED | OUTPATIENT
Start: 2024-11-07 | End: 2024-11-07

## 2024-11-07 RX ADMIN — DEXAMETHASONE SODIUM PHOSPHATE 8 MG: 4 INJECTION, SOLUTION INTRA-ARTICULAR; INTRALESIONAL; INTRAMUSCULAR; INTRAVENOUS; SOFT TISSUE at 11:11

## 2024-11-07 RX ADMIN — KETOROLAC TROMETHAMINE 60 MG: 30 INJECTION, SOLUTION INTRAMUSCULAR at 11:11

## 2024-11-07 NOTE — ED PROVIDER NOTES
Encounter Date: 11/7/2024       History     Chief Complaint   Patient presents with    Foot Pain     Pt with complaints of right foot pain that started at 2 am this morning. He reports hx of gout.       9-year-old male with a history of diabetes, hypertension, hyperlipidemia, GERD, gout presents with a complaint of right great toe and foot pain and swelling.  Onset last night.  Provocative factors include touch, weight-bearing, ambulation.  No palliative factors reported.  Patient denies injury or trauma.  He states that his gout is flaring up.  He denies fever, chills, systemic symptoms.    The history is provided by the patient. No  was used.     Review of patient's allergies indicates:  No Known Allergies  Past Medical History:   Diagnosis Date    Diabetes mellitus     GERD (gastroesophageal reflux disease)     Hypertension      Past Surgical History:   Procedure Laterality Date    COLONOSCOPY W/ POLYPECTOMY  01/19/2021     Family History   Problem Relation Name Age of Onset    Hypertension Mother      Hypertension Father      Aneurysm Father       Social History     Tobacco Use    Smoking status: Some Days     Types: Cigarettes     Start date: 5/1/1981    Smokeless tobacco: Former   Substance Use Topics    Alcohol use: Never    Drug use: Not Currently     Frequency: 7.0 times per week     Types: Marijuana     Review of Systems   Constitutional:  Negative for appetite change, chills and fever.   HENT:  Negative for sore throat.    Respiratory:  Negative for shortness of breath.    Cardiovascular:  Negative for chest pain.   Gastrointestinal:  Negative for abdominal pain, diarrhea, nausea and vomiting.   Genitourinary:  Negative for dysuria.   Musculoskeletal:  Positive for joint swelling (pain and swelling to right great toe and foot). Negative for back pain.   Skin:  Negative for rash.   Neurological:  Negative for weakness.   Hematological:  Does not bruise/bleed easily.   All other systems  reviewed and are negative.      Physical Exam     Initial Vitals [11/07/24 1054]   BP Pulse Resp Temp SpO2   (!) 160/75 77 18 98.3 °F (36.8 °C) 100 %      MAP       --         Physical Exam    Nursing note and vitals reviewed.  Constitutional: He appears well-developed and well-nourished.   HENT:   Head: Normocephalic and atraumatic.   Eyes: Conjunctivae and EOM are normal. Pupils are equal, round, and reactive to light.   Neck: Neck supple.   Normal range of motion.  Cardiovascular:  Normal rate and regular rhythm.           Pulmonary/Chest: Breath sounds normal.   Musculoskeletal:         General: Normal range of motion.      Cervical back: Normal range of motion and neck supple.      Right foot: Normal range of motion and normal capillary refill. Swelling and tenderness present. No deformity, bunion, Charcot foot, foot drop, prominent metatarsal heads, laceration, bony tenderness or crepitus. Normal pulse.      Left foot: Normal.        Legs:       Comments: Erythema, warmth, tenderness to MTP of right great toe     Neurological: He is alert and oriented to person, place, and time. He has normal strength. GCS score is 15. GCS eye subscore is 4. GCS verbal subscore is 5. GCS motor subscore is 6.   Skin: Skin is warm and dry.   Psychiatric: He has a normal mood and affect.         ED Course   Procedures  Labs Reviewed - No data to display       Imaging Results    None          Medications   ketorolac injection 60 mg (60 mg Intramuscular Given 11/7/24 1117)   dexAMETHasone injection 8 mg (8 mg Intramuscular Given 11/7/24 1117)     Medical Decision Making  Differential diagnoses:  Right great toe fracture, right great toe contusion, cellulitis, gout  59-year-old male with a history of diabetes, hypertension, hyperlipidemia, GERD, gout presents with a complaint of right great toe and foot pain and swelling.  Onset last night.  Provocative factors include touch, weight-bearing, ambulation.  No palliative factors  reported.  Patient denies injury or trauma.  He states that his gout is flaring up.  He denies fever, chills, systemic symptoms.  Physical exam as documented.  Patient is nontoxic in appearance and in no acute distress.  His symptoms are consistent with a gout flare.  I will treat him with anti-inflammatories and steroids.  I have encouraged him to follow up with his primary care provider for re-evaluation if his symptoms persist or worsened after being on the medication.  He may return to the emergency department also for worsening of symptoms or development of systemic symptoms.  Patient verbalized understanding of the plan and agrees.    Risk  Prescription drug management.                                      Clinical Impression:  Final diagnoses:  [M10.9] Acute gout of right foot, unspecified cause (Primary)          ED Disposition Condition    Discharge Stable          ED Prescriptions       Medication Sig Dispense Start Date End Date Auth. Provider    predniSONE (DELTASONE) 20 MG tablet Take 2 tablets (40 mg total) by mouth once daily. for 5 days 10 tablet 11/7/2024 11/12/2024 Emmie Hart NP    indomethacin (INDOCIN) 50 MG capsule Take 1 capsule (50 mg total) by mouth 3 (three) times daily. for 5 days 15 capsule 11/7/2024 11/12/2024 Emmie Hart NP          Follow-up Information       Follow up With Specialties Details Why Contact Info    Your primary care provider  In 1 week For ED follow-up              Emmie Hart NP  11/07/24 7090

## 2024-11-22 ENCOUNTER — HOSPITAL ENCOUNTER (EMERGENCY)
Facility: HOSPITAL | Age: 59
Discharge: HOME | End: 2024-11-22
Attending: EMERGENCY MEDICINE
Payer: MEDICAID

## 2024-11-22 VITALS
HEART RATE: 97 BPM | OXYGEN SATURATION: 99 % | DIASTOLIC BLOOD PRESSURE: 106 MMHG | BODY MASS INDEX: 29.74 KG/M2 | RESPIRATION RATE: 18 BRPM | WEIGHT: 224.44 LBS | TEMPERATURE: 98 F | SYSTOLIC BLOOD PRESSURE: 184 MMHG | HEIGHT: 73 IN

## 2024-11-22 DIAGNOSIS — R53.1 WEAKNESS: ICD-10-CM

## 2024-11-22 DIAGNOSIS — M54.12 CERVICAL RADICULOPATHY: Primary | ICD-10-CM

## 2024-11-22 LAB
OHS QRS DURATION: 104 MS
OHS QTC CALCULATION: 401 MS
POCT GLUCOSE: 125 MG/DL (ref 70–110)

## 2024-11-22 PROCEDURE — 82962 GLUCOSE BLOOD TEST: CPT

## 2024-11-22 PROCEDURE — 99284 EMERGENCY DEPT VISIT MOD MDM: CPT | Mod: 25

## 2024-11-22 PROCEDURE — 93005 ELECTROCARDIOGRAM TRACING: CPT

## 2024-11-22 PROCEDURE — 25000003 PHARM REV CODE 250: Performed by: PHYSICIAN ASSISTANT

## 2024-11-22 RX ORDER — HYDROCODONE BITARTRATE AND ACETAMINOPHEN 5; 325 MG/1; MG/1
1 TABLET ORAL
Status: COMPLETED | OUTPATIENT
Start: 2024-11-22 | End: 2024-11-22

## 2024-11-22 RX ORDER — HYDROCODONE BITARTRATE AND ACETAMINOPHEN 5; 325 MG/1; MG/1
1 TABLET ORAL EVERY 6 HOURS PRN
Qty: 12 TABLET | Refills: 0 | Status: SHIPPED | OUTPATIENT
Start: 2024-11-22 | End: 2024-11-26 | Stop reason: ALTCHOICE

## 2024-11-22 RX ADMIN — HYDROCODONE BITARTRATE AND ACETAMINOPHEN 1 TABLET: 5; 325 TABLET ORAL at 08:11

## 2024-11-22 NOTE — ED PROVIDER NOTES
Encounter Date: 11/22/2024       History     Chief Complaint   Patient presents with    Extremity Weakness     Presents from home with c/o intermittent right arm tingling, pain, numbness to thumb x3 days. Has been taking OTC ibuprofen with mild symptom relief. No reported injury or fall.      59-year-old male with a history of diabetes, GERD and hypertension, presents to the emergency department with complaints of intermittent right hand and arm tingling, pain and occasional numbness to right thumb x3 days.  Patient states he has been taking over-the-counter ibuprofen with mild symptom relief.  He denies injury.  He rates his pain 10/10.  He denies fever, chills, shortness of breath, chest pain.    The history is provided by the patient. No  was used.     Review of patient's allergies indicates:  No Known Allergies  Past Medical History:   Diagnosis Date    Diabetes mellitus     GERD (gastroesophageal reflux disease)     Hypertension      Past Surgical History:   Procedure Laterality Date    COLONOSCOPY W/ POLYPECTOMY  01/19/2021     Family History   Problem Relation Name Age of Onset    Hypertension Mother      Hypertension Father      Aneurysm Father       Social History     Tobacco Use    Smoking status: Some Days     Types: Cigarettes     Start date: 5/1/1981    Smokeless tobacco: Former   Substance Use Topics    Alcohol use: Never    Drug use: Not Currently     Frequency: 7.0 times per week     Types: Marijuana     Review of Systems   Constitutional:  Negative for chills and fever.   Respiratory:  Negative for cough and shortness of breath.    Cardiovascular:  Negative for chest pain and palpitations.   Gastrointestinal:  Negative for abdominal pain, nausea and vomiting.   Musculoskeletal:  Negative for back pain and neck pain.        Right arm and hand pain   Skin:  Negative for color change, pallor, rash and wound.   Neurological:  Positive for numbness (Right thumb). Negative for  dizziness, light-headedness and headaches.       Physical Exam     Initial Vitals [11/22/24 0751]   BP Pulse Resp Temp SpO2   (!) 184/106 97 20 97.7 °F (36.5 °C) 99 %      MAP       --         Physical Exam    Nursing note and vitals reviewed.  Constitutional: He appears well-developed and well-nourished.   Eyes: Conjunctivae and EOM are normal. Pupils are equal, round, and reactive to light.   Neck:   Normal range of motion.  Cardiovascular:  Normal rate, regular rhythm, normal heart sounds and intact distal pulses.           Pulmonary/Chest: Breath sounds normal. No respiratory distress. He has no wheezes. He has no rhonchi. He has no rales. He exhibits no tenderness.   Abdominal: Abdomen is soft. Bowel sounds are normal.   Musculoskeletal:         General: Normal range of motion.      Cervical back: Normal range of motion.     Neurological: He is alert and oriented to person, place, and time. He has normal strength. No cranial nerve deficit. GCS score is 15. GCS eye subscore is 4. GCS verbal subscore is 5. GCS motor subscore is 6.   Skin: Skin is warm. Capillary refill takes less than 2 seconds.         ED Course   Procedures  Labs Reviewed - No data to display  EKG Readings: (Independently Interpreted)   Initial Reading: No STEMI. Rhythm: Normal Sinus Rhythm. Heart Rate: 79. Ectopy: No Ectopy. ST Segments: Normal ST Segments. Axis: Normal.     ECG Results              EKG 12-lead (Weakness) Age > 50 (In process)        Collection Time Result Time QRS Duration OHS QTC Calculation    11/22/24 07:57:08 11/22/24 08:47:48 104 401                     In process by Interface, Lab In [facility] (11/22/24 08:47:54)                   Narrative:    Test Reason : R53.1,    Vent. Rate :  79 BPM     Atrial Rate :  79 BPM     P-R Int : 156 ms          QRS Dur : 104 ms      QT Int : 350 ms       P-R-T Axes :  78  76  14 degrees    QTcB Int : 401 ms    Normal sinus rhythm  Normal ECG  When compared with ECG of 11-Apr-2024 08:11,  T  wave inversion now evident in Inferior leads  Nonspecific T wave abnormality now evident in Lateral leads    Referred By: AAAREFERRAL SELF           Confirmed By:                                   Imaging Results              X-Ray Cervical Spine AP And Lateral (Final result)  Result time 11/22/24 08:48:51      Final result by Carrie Xiao MD (11/22/24 08:48:51)                   Impression:      1. No acute abnormality identified.  2. Mild degenerative changes of the cervical spine.      Electronically signed by: Carrie Xiao  Date:    11/22/2024  Time:    08:48               Narrative:    EXAMINATION:  XR CERVICAL SPINE AP LATERAL    CLINICAL HISTORY:  neck pain, radiaing into right arm;    COMPARISON:  X-rays dated 11/27/2022    FINDINGS:  Cervical alignment is normal.  The vertebral body heights and disc spaces are maintained.  There are small multilevel marginal osteophytes.  The soft tissues are unremarkable.                                       Medications   HYDROcodone-acetaminophen 5-325 mg per tablet 1 tablet (1 tablet Oral Given 11/22/24 0824)     Medical Decision Making  59-year-old male with a history of diabetes, GERD and hypertension, presents to the emergency department with complaints of intermittent right hand and arm tingling, pain and occasional numbness to right thumb x3 days.  Patient states he has been taking over-the-counter ibuprofen with mild symptom relief.  He denies injury.  He rates his pain 10/10.  He denies fever, chills, shortness of breath, chest pain.    DDx:  Radiculopathy, tendinitis, carpal tunnel    X-ray cervical spine shows mild degenerative changes of cervical spine.  Norco was given for pain because patient states he is on a blood thinner.  Unable to find evidence for this however avoided NSAIDs to be safe.  Patient much better after medication.  Norco prescribed.  Normal neuro and physical exam.      Amount and/or Complexity of Data Reviewed  Radiology:  ordered. Decision-making details documented in ED Course.    Risk  Prescription drug management.               ED Course as of 11/22/24 0947   Fri Nov 22, 2024   0852 X-Ray Cervical Spine AP And Lateral   No acute abnormality identified.   Mild degenerative changes of the cervical spine.   [ER]   0927 The patient is resting comfortably and in no acute distress.  He states that his symptoms have improved after treatment in Emergency Department. I personally discussed his test results and treatment plan.  Gave strict ED precautions and specific conditions for return to the emergency department and importance of follow up with pcp.  Patient voices understanding and agrees to the plan discussed. All patients' questions have been answered at this time. He has remained hemodynamically stable throughout entire stay in ED and is stable for discharge home. [ER]      ED Course User Index  [ER] Maty Sadler PA                           Clinical Impression:  Final diagnoses:  [R53.1] Weakness  [M54.12] Cervical radiculopathy (Primary)          ED Disposition Condition    Discharge Stable          ED Prescriptions       Medication Sig Dispense Start Date End Date Auth. Provider    HYDROcodone-acetaminophen (NORCO) 5-325 mg per tablet Take 1 tablet by mouth every 6 (six) hours as needed for Pain. 12 tablet 11/22/2024 12/4/2024 Maty Sadler PA          Follow-up Information       Follow up With Specialties Details Why Contact Info Additional Information    [facility] - Emergency Dept Emergency Medicine  As needed, If symptoms worsen [address] [facility] [address] [phone]     [facility] - Internal Medicine Internal Medicine Call   [address] [facility] [address] [phone] Internal Medicine Clinic Entrance #1             Maty Sadler PA  11/22/24 1433

## 2024-11-22 NOTE — DISCHARGE INSTRUCTIONS
Take medications as prescribed as needed for pain with food and plenty of water.   Apply heating pad to sore muscles ( being careful not to burn skin).  Avoid lifting heavy.  Follow up with your doctor within 2-3 days and return if symptoms worsen.

## 2024-11-26 ENCOUNTER — HOSPITAL ENCOUNTER (EMERGENCY)
Facility: HOSPITAL | Age: 59
Discharge: HOME | End: 2024-11-26
Attending: EMERGENCY MEDICINE
Payer: MEDICAID

## 2024-11-26 VITALS
HEART RATE: 91 BPM | WEIGHT: 226 LBS | RESPIRATION RATE: 17 BRPM | SYSTOLIC BLOOD PRESSURE: 161 MMHG | TEMPERATURE: 99 F | DIASTOLIC BLOOD PRESSURE: 82 MMHG | HEIGHT: 73 IN | OXYGEN SATURATION: 97 % | BODY MASS INDEX: 29.95 KG/M2

## 2024-11-26 DIAGNOSIS — M54.12 CERVICAL RADICULOPATHY AT C5: Primary | ICD-10-CM

## 2024-11-26 PROCEDURE — 96372 THER/PROPH/DIAG INJ SC/IM: CPT | Performed by: EMERGENCY MEDICINE

## 2024-11-26 PROCEDURE — 63600175 PHARM REV CODE 636 W HCPCS: Performed by: EMERGENCY MEDICINE

## 2024-11-26 PROCEDURE — 99284 EMERGENCY DEPT VISIT MOD MDM: CPT | Mod: 25

## 2024-11-26 RX ORDER — KETOROLAC TROMETHAMINE 30 MG/ML
30 INJECTION, SOLUTION INTRAMUSCULAR; INTRAVENOUS
Status: COMPLETED | OUTPATIENT
Start: 2024-11-26 | End: 2024-11-26

## 2024-11-26 RX ORDER — CELECOXIB 200 MG/1
200 CAPSULE ORAL DAILY
Qty: 15 CAPSULE | Refills: 0 | Status: SHIPPED | OUTPATIENT
Start: 2024-11-26 | End: 2024-12-11

## 2024-11-26 RX ORDER — METHYLPREDNISOLONE ACETATE 80 MG/ML
80 INJECTION, SUSPENSION INTRA-ARTICULAR; INTRALESIONAL; INTRAMUSCULAR; SOFT TISSUE
Status: COMPLETED | OUTPATIENT
Start: 2024-11-26 | End: 2024-11-26

## 2024-11-26 RX ORDER — METHOCARBAMOL 750 MG/1
1500 TABLET, FILM COATED ORAL 3 TIMES DAILY
Qty: 30 TABLET | Refills: 0 | Status: SHIPPED | OUTPATIENT
Start: 2024-11-26 | End: 2024-12-01

## 2024-11-26 RX ADMIN — KETOROLAC TROMETHAMINE 30 MG: 30 INJECTION, SOLUTION INTRAMUSCULAR at 03:11

## 2024-11-26 RX ADMIN — METHYLPREDNISOLONE ACETATE 80 MG: 80 INJECTION, SUSPENSION INTRA-ARTICULAR; INTRALESIONAL; INTRAMUSCULAR; SOFT TISSUE at 03:11

## 2024-11-26 NOTE — ED PROVIDER NOTES
Encounter Date: 11/26/2024       History     Chief Complaint   Patient presents with    Wrist Pain     Pt c/o right wrist pain/ swelling x 1 week; denies injury; reports hx of gout. Pt states he was seen on 11/22 at [facility] for this and d/c with norco.      This 59-year-old man presents with complaints of pain in his right arm his right thumb some numbness and swelling that he perceives over his distal right forearm.  He states his symptoms have been there for several weeks.  He was seen at [facility] where he was given Norco after he told him he was on blood thinners.  He is not on blood thinners.  I have seen him in the past for similar problems which I determined to be cervical radiculopathy.       Review of patient's allergies indicates:  No Known Allergies  Past Medical History:   Diagnosis Date    Diabetes mellitus     GERD (gastroesophageal reflux disease)     Hypertension      Past Surgical History:   Procedure Laterality Date    COLONOSCOPY W/ POLYPECTOMY  01/19/2021     Family History   Problem Relation Name Age of Onset    Hypertension Mother      Hypertension Father      Aneurysm Father       Social History     Tobacco Use    Smoking status: Some Days     Types: Cigarettes     Start date: 5/1/1981    Smokeless tobacco: Former   Substance Use Topics    Alcohol use: Never    Drug use: Not Currently     Frequency: 7.0 times per week     Types: Marijuana     Review of Systems   Constitutional:  Negative for fever.   HENT:  Negative for sore throat.    Respiratory:  Negative for shortness of breath.    Cardiovascular:  Negative for chest pain.   Gastrointestinal:  Negative for nausea.   Genitourinary:  Negative for dysuria.   Musculoskeletal:  Negative for back pain.   Skin:  Negative for rash.   Neurological:  Positive for numbness. Negative for weakness.   Hematological:  Does not bruise/bleed easily.       Physical Exam     Initial Vitals [11/26/24 1513]   BP Pulse Resp Temp SpO2   (!) 162/62 91 17 98.9 °F (37.2 °C)  97 %      MAP       --         Physical Exam    Nursing note and vitals reviewed.  Constitutional: He appears well-developed and well-nourished.   HENT:   Head: Normocephalic and atraumatic. Mouth/Throat: Mucous membranes are normal.   Eyes: EOM are normal. Pupils are equal, round, and reactive to light.   Neck: Neck supple.   Normal range of motion.  Cardiovascular:  Normal rate, regular rhythm, normal heart sounds and intact distal pulses.           Pulmonary/Chest: Breath sounds normal.   Abdominal: Abdomen is soft. Bowel sounds are normal.   Musculoskeletal:         General: Normal range of motion.      Cervical back: Normal range of motion and neck supple.     Neurological: He is alert and oriented to person, place, and time. He has normal strength.   Skin: Skin is warm and dry. Capillary refill takes less than 2 seconds.   Psychiatric: He has a normal mood and affect. His behavior is normal. Judgment and thought content normal.         ED Course   Procedures  Labs Reviewed - No data to display       Imaging Results    None          Medications   ketorolac injection 30 mg (has no administration in time range)   methylPREDNISolone acetate injection 80 mg (has no administration in time range)     Medical Decision Making                                    Clinical Impression:  Final diagnoses:  [M54.12] Cervical radiculopathy at C5 (Primary)          ED Disposition Condition    Discharge Stable          ED Prescriptions       Medication Sig Dispense Start Date End Date Auth. Provider    methocarbamoL (ROBAXIN) 750 MG Tab Take 2 tablets (1,500 mg total) by mouth 3 (three) times daily. for 5 days 30 tablet 11/26/2024 12/1/2024 Frank Moss MD    celecoxib (CELEBREX) 200 MG capsule Take 1 capsule (200 mg total) by mouth once daily. for 15 days 15 capsule 11/26/2024 12/11/2024 Frank Moss MD          Follow-up Information       Follow up With Specialties Details Why Contact Info    If symptoms persist  follow-up with your doctor at [facility].                 Frank Moss MD  11/26/24 [id number]

## 2024-12-10 DIAGNOSIS — E11.65 TYPE 2 DIABETES MELLITUS WITH HYPERGLYCEMIA, WITHOUT LONG-TERM CURRENT USE OF INSULIN: Chronic | ICD-10-CM

## 2024-12-10 DIAGNOSIS — I10 PRIMARY HYPERTENSION: Primary | Chronic | ICD-10-CM

## 2024-12-11 RX ORDER — HYDROCHLOROTHIAZIDE 25 MG/1
25 TABLET ORAL DAILY
Qty: 90 TABLET | Refills: 2 | Status: SHIPPED | OUTPATIENT
Start: 2024-12-11 | End: 2025-12-11

## 2024-12-11 RX ORDER — AMLODIPINE BESYLATE 10 MG/1
10 TABLET ORAL DAILY
Qty: 90 TABLET | Refills: 2 | Status: SHIPPED | OUTPATIENT
Start: 2024-12-11 | End: 2025-12-11

## 2025-03-25 ENCOUNTER — OFFICE VISIT (OUTPATIENT)
Dept: INTERNAL MEDICINE | Facility: CLINIC | Age: 60
End: 2025-03-25
Payer: MEDICAID

## 2025-03-25 ENCOUNTER — HOSPITAL ENCOUNTER (OUTPATIENT)
Dept: RADIOLOGY | Facility: HOSPITAL | Age: 60
Discharge: HOME | End: 2025-03-25
Attending: NURSE PRACTITIONER
Payer: MEDICAID

## 2025-03-25 VITALS
HEART RATE: 58 BPM | SYSTOLIC BLOOD PRESSURE: 130 MMHG | TEMPERATURE: 99 F | BODY MASS INDEX: 29.55 KG/M2 | WEIGHT: 223 LBS | DIASTOLIC BLOOD PRESSURE: 73 MMHG | HEIGHT: 73 IN | RESPIRATION RATE: 18 BRPM

## 2025-03-25 DIAGNOSIS — E66.01 CLASS 2 SEVERE OBESITY DUE TO EXCESS CALORIES WITH SERIOUS COMORBIDITY AND BODY MASS INDEX (BMI) OF 36.0 TO 36.9 IN ADULT: Chronic | ICD-10-CM

## 2025-03-25 DIAGNOSIS — E78.2 MIXED HYPERLIPIDEMIA: Chronic | ICD-10-CM

## 2025-03-25 DIAGNOSIS — M79.631 RIGHT FOREARM PAIN: ICD-10-CM

## 2025-03-25 DIAGNOSIS — E66.812 CLASS 2 SEVERE OBESITY DUE TO EXCESS CALORIES WITH SERIOUS COMORBIDITY AND BODY MASS INDEX (BMI) OF 36.0 TO 36.9 IN ADULT: Chronic | ICD-10-CM

## 2025-03-25 DIAGNOSIS — M10.9 GOUT, UNSPECIFIED CAUSE, UNSPECIFIED CHRONICITY, UNSPECIFIED SITE: ICD-10-CM

## 2025-03-25 DIAGNOSIS — I10 PRIMARY HYPERTENSION: Primary | Chronic | ICD-10-CM

## 2025-03-25 DIAGNOSIS — F17.210 CIGARETTE NICOTINE DEPENDENCE WITHOUT COMPLICATION: Chronic | ICD-10-CM

## 2025-03-25 DIAGNOSIS — G47.10 HYPERSOMNIA: ICD-10-CM

## 2025-03-25 DIAGNOSIS — E11.65 TYPE 2 DIABETES MELLITUS WITH HYPERGLYCEMIA, WITHOUT LONG-TERM CURRENT USE OF INSULIN: Chronic | ICD-10-CM

## 2025-03-25 DIAGNOSIS — R06.81 WITNESSED EPISODE OF APNEA: ICD-10-CM

## 2025-03-25 DIAGNOSIS — Z12.5 SCREENING FOR PROSTATE CANCER: ICD-10-CM

## 2025-03-25 DIAGNOSIS — R73.9 HYPERGLYCEMIA: ICD-10-CM

## 2025-03-25 PROCEDURE — 99214 OFFICE O/P EST MOD 30 MIN: CPT | Mod: S$PBB,,, | Performed by: NURSE PRACTITIONER

## 2025-03-25 PROCEDURE — 3075F SYST BP GE 130 - 139MM HG: CPT | Mod: CPTII,,, | Performed by: NURSE PRACTITIONER

## 2025-03-25 PROCEDURE — 3078F DIAST BP <80 MM HG: CPT | Mod: CPTII,,, | Performed by: NURSE PRACTITIONER

## 2025-03-25 PROCEDURE — 3008F BODY MASS INDEX DOCD: CPT | Mod: CPTII,,, | Performed by: NURSE PRACTITIONER

## 2025-03-25 PROCEDURE — 1160F RVW MEDS BY RX/DR IN RCRD: CPT | Mod: CPTII,,, | Performed by: NURSE PRACTITIONER

## 2025-03-25 PROCEDURE — 99215 OFFICE O/P EST HI 40 MIN: CPT | Mod: PBBFAC,25 | Performed by: NURSE PRACTITIONER

## 2025-03-25 PROCEDURE — 73090 X-RAY EXAM OF FOREARM: CPT | Mod: TC,RT

## 2025-03-25 PROCEDURE — 1159F MED LIST DOCD IN RCRD: CPT | Mod: CPTII,,, | Performed by: NURSE PRACTITIONER

## 2025-03-25 RX ORDER — LOSARTAN POTASSIUM 25 MG/1
25 TABLET ORAL DAILY
Qty: 90 TABLET | Refills: 1 | Status: SHIPPED | OUTPATIENT
Start: 2025-03-25 | End: 2026-03-25

## 2025-03-25 RX ORDER — HYDROCHLOROTHIAZIDE 25 MG/1
25 TABLET ORAL DAILY
Qty: 90 TABLET | Refills: 1 | Status: SHIPPED | OUTPATIENT
Start: 2025-03-25 | End: 2026-03-25

## 2025-03-25 NOTE — PROGRESS NOTES
Patient ID: [id number]     Chief Complaint: Establish Care        HPI:     HPI      Wilfrido Henriquez is a 59 y.o. male here today to establish care. Pt previously followed by Ruth Dunlap NP for PCP. Pt has hx HTN. HLD, Gout, Sleep apnea, Tobacco abuse, Rectal bleeding, Obesity, Cigarette nicotine dependence.  Pt c/o pain to right forearm for > 3 months. Denies injury. Pt unable to tell me which medications he is currently taking- is stating meds by color of pills. Pt states he is only taking 3 meds and gets them filled at [facility] in [place]. Meds dispensed per [place] is: Jardiance, HCTZ, Losartan. Pt states he needs a sleep study ordered as he has witnessed episodes of apnea by his SO and feeling tired all the time.           Immunizations:   Immunization History   Administered Date(s) Administered    COVID-19, MRNA, LN-S, PF (MODERNA FULL 0.5 ML DOSE) 08/15/2021, 09/17/2021    Influenza - Trivalent - Fluarix, Flulaval, Fluzone, Afluria - PF 12/18/2015        -------------------------------------    Diabetes mellitus    GERD (gastroesophageal reflux disease)    Hypertension        Past Surgical History:   Procedure Laterality Date    COLONOSCOPY W/ POLYPECTOMY  01/19/2021       Review of patient's allergies indicates:  No Known Allergies    Current Outpatient Medications   Medication Instructions    acetaminophen (TYLENOL) 650 mg, Oral, Every 8 hours PRN    allopurinoL (ZYLOPRIM) 100 mg, Oral, Daily    amLODIPine (NORVASC) 10 mg, Oral, Daily    blood sugar diagnostic Strp 1 each, Misc.(Non-Drug; Combo Route), 2 times daily    blood-glucose meter (FREESTYLE SYSTEM KIT) kit Use as instructed    empagliflozin (JARDIANCE) 10 mg, Oral, Daily    hydroCHLOROthiazide (HYDRODIURIL) 25 mg, Oral, Daily    lancets (ONETOUCH ULTRASOFT LANCETS) Misc 1 Box , Misc.(Non-Drug; Combo Route), 2 times daily    losartan (COZAAR) 25 mg, Oral, Daily    polyethylene glycol (GLYCOLAX) 17 g, Oral, Daily PRN       Social  "History[1]     Family History   Problem Relation Name Age of Onset    Hypertension Mother      Hypertension Father      Aneurysm Father          Patient Care Team:  Wanda Garcia FNP as PCP - General (Family Medicine)  Linda Richard LPN as Care Coordinator     Subjective:     Review of Systems     See HPI for details    Constitutional: Denies Change in appetite. Denies Chills. Denies Fever. Denies Night sweats.  Eye: Denies Blurred vision. Denies Discharge. Denies Eye pain.  ENT: Denies Decreased hearing. Denies Sore throat. Denies Swollen glands.  Respiratory: Denies Cough. Denies Shortness of breath. Denies Shortness of breath with exertion. Denies Wheezing.  Cardiovascular: DeniesChest pain at rest. Denies Chest pain with exertion. Denies Irregular heartbeat. Denies Palpitations. Denies Edema.  Gastrointestinal: Denies Abdominal pain. DeniesDiarrhea. Denies Nausea. Denies Vomiting. Denies Hematemesis or Hematochezia.  Genitourinary: Denies Dysuria. Denies Urinary frequency. Denies Urinary urgency. Denies Blood in urine.  Endocrine: Denies Cold intolerance. Denies Excessive thirst. Denies Heat intolerance. Denies Weight loss. Denies Weight gain.  Musculoskeletal: Denies Painful joints. Denies Weakness.  Integumentary: Denies Rash. Denies Itching. Denies Dry skin.  Neurologic: Denies Dizziness. Denies Fainting. Denies Headache.  Psychiatric: Denies Depression. Denies Anxiety. Denies Suicidal/Homicidal ideations.    All Other ROS: Negative except as stated in HPI.       Objective:     Visit Vitals  /73 (BP Location: Left arm, Patient Position: Sitting)   Pulse (!) 58   Temp 98.7 °F (37.1 °C) (Oral)   Resp 18   Ht 6' 1" (1.854 m)   Wt 101.2 kg (223 lb)   BMI 29.42 kg/m²       Physical Exam    General: Alert and oriented, No acute distress.  Head: Normocephalic, Atraumatic.  Eye: Pupils are equal, round and reactive to light, Extraocular movements are intact, Sclera non-icteric.  Ears/Nose/Throat: " Normal, Mucosa moist,Clear.  Neck/Thyroid: Supple, Non-tender, No carotid bruit, No lymphadenopathy, No JVD, Full range of motion.  Respiratory: Clear to auscultation bilaterally; No wheezes, rales or rhonchi,Non-labored respirations, Symmetrical chest wall expansion.  Cardiovascular: Regular rate and rhythm, S1/S2 normal, No murmurs, rubs or gallops.  Gastrointestinal: Soft, Non-tender, Non-distended, Normal bowel sounds, No palpable organomegaly.  Musculoskeletal: Normal range of motion.  Integumentary: Warm, Dry, Intact, No suspicious lesions or rashes.  Extremities: No clubbing, cyanosis or edema  Neurologic: No focal deficits, Cranial Nerves II-XII are grossly intact, Motor strength normal upper and lower extremities, Sensory exam intact.  Psychiatric: Normal interaction, Coherent speech, Euthymic mood, Appropriate affect       Labs Reviewed:     Chemistry:  Lab Results   Component Value Date     04/11/2024    K 3.5 04/11/2024    BUN 14.2 04/11/2024    CREATININE 1.00 04/11/2024    EGFRNORACEVR >60 04/11/2024    GLUCOSE 140 (H) 04/11/2024    CALCIUM 10.2 04/11/2024    ALKPHOS 73 04/11/2024    LABPROT 8.0 04/11/2024    ALBUMIN 4.3 04/11/2024    BILIDIR 0.1 08/19/2021    IBILI 0.30 08/19/2021    AST 19 04/11/2024    ALT 22 04/11/2024    [id number] 32.6 08/09/2023        Lab Results   Component Value Date    HGBA1C 6.0 12/20/2023        Hematology:  Lab Results   Component Value Date    WBC 6.90 04/11/2024    HGB 16.7 04/11/2024    HCT 47.7 04/11/2024     04/11/2024       Lipid Panel:  Lab Results   Component Value Date    CHOL 197 08/09/2023    HDL 47 08/09/2023    .00 08/09/2023    TRIG 233 (H) 08/09/2023    TOTALCHOLEST 4 08/09/2023        Urine:  Lab Results   Component Value Date    APPEARANCEUA Clear 06/21/2023    SGUA 1.004 06/21/2023    PROTEINUA Negative 06/21/2023    KETONESUA Negative 06/21/2023    LEUKOCYTESUR Negative 06/21/2023    RBCUA 6-10 (A) 06/21/2023    WBCUA None Seen  06/21/2023    BACTERIA None Seen 06/21/2023    SQEPUA Trace (A) 06/21/2023    HYALINECASTS None Seen 06/21/2023    CREATRANDUR 47.7 (L) 08/09/2023        Assessment:       ICD-10-CM ICD-9-CM   1. Primary hypertension  I10 401.9   2. Mixed hyperlipidemia  E78.2 272.2   3. Class 2 severe obesity due to excess calories with serious comorbidity and body mass index (BMI) of 36.0 to 36.9 in adult  E66.812 278.01    E66.01 V85.36    Z68.36    4. Cigarette nicotine dependence without complication  F17.210 305.1   5. Hyperglycemia  R73.9 790.29   6. Screening for prostate cancer  Z12.5 V76.44        Plan:     1. Primary hypertension (Primary)  BP controlled. Low fat low salt diet and exercise. Cont Amlodipine, Losartan as prescribed.     2. Mixed hyperlipidemia  Low fat diet and exercise. FLP in 3 months.     3. Class 2 severe obesity due to excess calories with serious comorbidity and body mass index (BMI) of 36.0 to 36.9 in adult  BMI     4. Cigarette nicotine dependence without complication  Encouraged smoking cessation. Education provided.     5. Hyperglycemia  Per Lab results- Glucose ranges . Encouraged ADA diet and exercise. Pt unable to tell me what meds he takes daily. Pt did not bring  bottles of medication.     6. Screening for prostate cancer  PSA in 3 months.      7. Hypersomnia  Refer to sleep lab for sleep study.     8. Witnessed apnea  Refer to sleep lab for sleep study. Pt's SO states pt holds his breath at night.     Follow up in about 3 months (around 6/25/2025) for with labs 1 week prior to appt. . In addition to their scheduled follow up, the patient has also been instructed to follow up on as needed basis.     No future appointments.     BRADY Kelley             [1]   Social History  Socioeconomic History    Marital status: Single   Occupational History    Occupation: unemployed   Tobacco Use    Smoking status: Former     Types: Cigarettes     Start date: 5/1/1981    Smokeless tobacco:  Former   Substance and Sexual Activity    Alcohol use: Never    Drug use: Yes     Frequency: 7.0 times per week     Types: Marijuana    Sexual activity: Yes     Partners: Female     Social Drivers of Health     Financial Resource Strain: Low Risk  (3/25/2025)    Overall Financial Resource Strain (CARDIA)     Difficulty of Paying Living Expenses: Not hard at all   Food Insecurity: No Food Insecurity (3/25/2025)    Hunger Vital Sign     Worried About Running Out of Food in the Last Year: Never true     Ran Out of Food in the Last Year: Never true   Transportation Needs: No Transportation Needs (3/25/2025)    PRAPARE - Transportation     Lack of Transportation (Medical): No     Lack of Transportation (Non-Medical): No   Physical Activity: Inactive (3/25/2025)    Exercise Vital Sign     Days of Exercise per Week: 0 days     Minutes of Exercise per Session: 0 min   Stress: No Stress Concern Present (3/25/2025)    [employer] [place] [employer] - Occupational Stress Questionnaire     Feeling of Stress : Only a little   Housing Stability: Low Risk  (3/25/2025)    Housing Stability Vital Sign     Unable to Pay for Housing in the Last Year: No     Homeless in the Last Year: No

## 2025-03-25 NOTE — TELEPHONE ENCOUNTER
Called patient informed him his medication Losartan was ready for  at his pharmacy. Patient verbalized understanding.

## 2025-07-10 DIAGNOSIS — G47.30 SLEEP APNEA, UNSPECIFIED TYPE: Primary | Chronic | ICD-10-CM

## 2025-07-10 NOTE — PROGRESS NOTES
Received notification from pt's insurance that an inhouse sleep study was not covered so a home sleep study has been ordered. They will call him with info on obtaining equipment for home sleep study.   Call pt and inform sleep study will be a home sleep study and the company [facility] [employer] will call him to set up supplies to perform test in his home.

## 2025-07-11 NOTE — PROGRESS NOTES
Received notification from pt's insurance that an inhouse sleep study was not covered so a home sleep study has been ordered. They will call him with info on obtaining equipment for home sleep study.   Call pt and inform sleep study will be a home sleep study and the company [facility] [employer] will call him to set up supplies to perform test in his home.           Informed patient with the above message via answering machine. Informed patient to return my call at [phone] if he had anyquestions.

## 2025-07-23 ENCOUNTER — LAB VISIT (OUTPATIENT)
Dept: LAB | Facility: HOSPITAL | Age: 60
End: 2025-07-23
Attending: NURSE PRACTITIONER
Payer: MEDICAID

## 2025-07-23 DIAGNOSIS — Z12.5 SCREENING FOR PROSTATE CANCER: Primary | ICD-10-CM

## 2025-07-23 DIAGNOSIS — Z12.5 SCREENING FOR PROSTATE CANCER: ICD-10-CM

## 2025-07-23 DIAGNOSIS — I10 PRIMARY HYPERTENSION: Chronic | ICD-10-CM

## 2025-07-23 DIAGNOSIS — I10 PRIMARY HYPERTENSION: ICD-10-CM

## 2025-07-23 LAB
ALBUMIN SERPL-MCNC: 4.4 G/DL (ref 3.4–4.8)
ALBUMIN/GLOB SERPL: 1.1 RATIO (ref 1.1–2)
ALP SERPL-CCNC: 88 UNIT/L (ref 40–150)
ALT SERPL-CCNC: 19 UNIT/L (ref 0–55)
ANION GAP SERPL CALC-SCNC: 10 MEQ/L
AST SERPL-CCNC: 15 UNIT/L (ref 11–45)
BASOPHILS # BLD AUTO: 0.04 X10(3)/MCL
BASOPHILS NFR BLD AUTO: 0.5 %
BILIRUB SERPL-MCNC: 0.6 MG/DL
BUN SERPL-MCNC: 18.5 MG/DL (ref 8.4–25.7)
CALCIUM SERPL-MCNC: 10.5 MG/DL (ref 8.8–10)
CHLORIDE SERPL-SCNC: 103 MMOL/L (ref 98–107)
CHOLEST SERPL-MCNC: 227 MG/DL
CHOLEST/HDLC SERPL: 5 {RATIO} (ref 0–5)
CO2 SERPL-SCNC: 28 MMOL/L (ref 23–31)
CREAT SERPL-MCNC: 1.19 MG/DL (ref 0.72–1.25)
CREAT/UREA NIT SERPL: 16
EOSINOPHIL # BLD AUTO: 0.1 X10(3)/MCL (ref 0–0.9)
EOSINOPHIL NFR BLD AUTO: 1.2 %
ERYTHROCYTE [DISTWIDTH] IN BLOOD BY AUTOMATED COUNT: 12.5 % (ref 11.5–17)
EST. AVERAGE GLUCOSE BLD GHB EST-MCNC: 168.6 MG/DL
GFR SERPLBLD CREATININE-BSD FMLA CKD-EPI: >60 ML/MIN/1.73/M2
GLOBULIN SER-MCNC: 4 GM/DL (ref 2.4–3.5)
GLUCOSE SERPL-MCNC: 167 MG/DL (ref 82–115)
HBA1C MFR BLD: 7.5 %
HCT VFR BLD AUTO: 49.3 % (ref 42–52)
HDLC SERPL-MCNC: 49 MG/DL (ref 35–60)
HGB BLD-MCNC: 16.9 G/DL (ref 14–18)
IMM GRANULOCYTES # BLD AUTO: 0.03 X10(3)/MCL (ref 0–0.04)
IMM GRANULOCYTES NFR BLD AUTO: 0.4 %
LDLC SERPL CALC-MCNC: 145 MG/DL (ref 50–140)
LYMPHOCYTES # BLD AUTO: 3.69 X10(3)/MCL (ref 0.6–4.6)
LYMPHOCYTES NFR BLD AUTO: 44.9 %
MCH RBC QN AUTO: 29.1 PG (ref 27–31)
MCHC RBC AUTO-ENTMCNC: 34.3 G/DL (ref 33–36)
MCV RBC AUTO: 85 FL (ref 80–94)
MONOCYTES # BLD AUTO: 0.57 X10(3)/MCL (ref 0.1–1.3)
MONOCYTES NFR BLD AUTO: 6.9 %
NEUTROPHILS # BLD AUTO: 3.79 X10(3)/MCL (ref 2.1–9.2)
NEUTROPHILS NFR BLD AUTO: 46.1 %
NRBC BLD AUTO-RTO: 0 %
PLATELET # BLD AUTO: 271 X10(3)/MCL (ref 130–400)
PMV BLD AUTO: 9.9 FL (ref 7.4–10.4)
POTASSIUM SERPL-SCNC: 3.6 MMOL/L (ref 3.5–5.1)
PROT SERPL-MCNC: 8.4 GM/DL (ref 5.8–7.6)
PSA SERPL-MCNC: 2.39 NG/ML
RBC # BLD AUTO: 5.8 X10(6)/MCL (ref 4.7–6.1)
SODIUM SERPL-SCNC: 141 MMOL/L (ref 136–145)
TRIGL SERPL-MCNC: 166 MG/DL (ref 34–140)
TSH SERPL-ACNC: 1.15 UIU/ML (ref 0.35–4.94)
URATE SERPL-MCNC: 7.8 MG/DL (ref 3.5–7.2)
VLDLC SERPL CALC-MCNC: 33 MG/DL
WBC # BLD AUTO: 8.22 X10(3)/MCL (ref 4.5–11.5)

## 2025-07-23 PROCEDURE — 84443 ASSAY THYROID STIM HORMONE: CPT

## 2025-07-23 PROCEDURE — 83036 HEMOGLOBIN GLYCOSYLATED A1C: CPT

## 2025-07-23 PROCEDURE — 85025 COMPLETE CBC W/AUTO DIFF WBC: CPT

## 2025-07-23 PROCEDURE — 36415 COLL VENOUS BLD VENIPUNCTURE: CPT

## 2025-07-23 PROCEDURE — 80053 COMPREHEN METABOLIC PANEL: CPT

## 2025-07-23 PROCEDURE — 84153 ASSAY OF PSA TOTAL: CPT

## 2025-07-23 PROCEDURE — 80061 LIPID PANEL: CPT

## 2025-07-23 PROCEDURE — 84550 ASSAY OF BLOOD/URIC ACID: CPT

## 2025-07-25 ENCOUNTER — OFFICE VISIT (OUTPATIENT)
Dept: INTERNAL MEDICINE | Facility: CLINIC | Age: 60
End: 2025-07-25
Payer: MEDICAID

## 2025-07-25 ENCOUNTER — CLINICAL SUPPORT (OUTPATIENT)
Dept: INTERNAL MEDICINE | Facility: CLINIC | Age: 60
End: 2025-07-25
Attending: NURSE PRACTITIONER
Payer: MEDICAID

## 2025-07-25 VITALS
SYSTOLIC BLOOD PRESSURE: 138 MMHG | DIASTOLIC BLOOD PRESSURE: 71 MMHG | BODY MASS INDEX: 30.35 KG/M2 | HEIGHT: 73 IN | WEIGHT: 229 LBS | HEART RATE: 60 BPM | RESPIRATION RATE: 20 BRPM | TEMPERATURE: 98 F

## 2025-07-25 DIAGNOSIS — E11.65 TYPE 2 DIABETES MELLITUS WITH HYPERGLYCEMIA, WITHOUT LONG-TERM CURRENT USE OF INSULIN: Chronic | ICD-10-CM

## 2025-07-25 DIAGNOSIS — I10 PRIMARY HYPERTENSION: Primary | Chronic | ICD-10-CM

## 2025-07-25 DIAGNOSIS — E66.812 CLASS 2 SEVERE OBESITY DUE TO EXCESS CALORIES WITH SERIOUS COMORBIDITY AND BODY MASS INDEX (BMI) OF 36.0 TO 36.9 IN ADULT: Chronic | ICD-10-CM

## 2025-07-25 DIAGNOSIS — R31.9 HEMATURIA, UNSPECIFIED TYPE: ICD-10-CM

## 2025-07-25 DIAGNOSIS — R73.9 HYPERGLYCEMIA: ICD-10-CM

## 2025-07-25 DIAGNOSIS — E11.65 TYPE 2 DIABETES MELLITUS WITH HYPERGLYCEMIA, WITHOUT LONG-TERM CURRENT USE OF INSULIN: Primary | Chronic | ICD-10-CM

## 2025-07-25 DIAGNOSIS — E66.01 CLASS 2 SEVERE OBESITY DUE TO EXCESS CALORIES WITH SERIOUS COMORBIDITY AND BODY MASS INDEX (BMI) OF 36.0 TO 36.9 IN ADULT: Chronic | ICD-10-CM

## 2025-07-25 DIAGNOSIS — Z12.11 SCREENING FOR COLON CANCER: ICD-10-CM

## 2025-07-25 DIAGNOSIS — K59.00 CONSTIPATION, UNSPECIFIED CONSTIPATION TYPE: ICD-10-CM

## 2025-07-25 DIAGNOSIS — E83.52 HYPERCALCEMIA: ICD-10-CM

## 2025-07-25 PROCEDURE — 99214 OFFICE O/P EST MOD 30 MIN: CPT | Mod: PBBFAC | Performed by: NURSE PRACTITIONER

## 2025-07-25 RX ORDER — HYDROCHLOROTHIAZIDE 25 MG/1
25 TABLET ORAL DAILY
Qty: 90 TABLET | Refills: 1 | Status: SHIPPED | OUTPATIENT
Start: 2025-07-25 | End: 2026-07-25

## 2025-07-25 RX ORDER — AMLODIPINE BESYLATE 10 MG/1
10 TABLET ORAL DAILY
Qty: 90 TABLET | Refills: 2 | Status: SHIPPED | OUTPATIENT
Start: 2025-07-25 | End: 2026-07-25

## 2025-07-25 RX ORDER — LOSARTAN POTASSIUM 25 MG/1
25 TABLET ORAL DAILY
Qty: 90 TABLET | Refills: 1 | Status: SHIPPED | OUTPATIENT
Start: 2025-07-25 | End: 2026-07-25

## 2025-07-25 NOTE — PROGRESS NOTES
Patient ID: [id number]     Chief Complaint: Hypertension        HPI:     HPI      Wilfrido Henriquez is a 60 y.o. male here today for a follow up.           Immunizations:   Immunization History   Administered Date(s) Administered    COVID-19, MRNA, LN-S, PF (MODERNA FULL 0.5 ML DOSE) 08/15/2021, 09/17/2021    Influenza - Trivalent - Fluarix, Flulaval, Fluzone, Afluria - PF 12/18/2015        -------------------------------------    Diabetes mellitus    GERD (gastroesophageal reflux disease)    Hypertension        Past Surgical History:   Procedure Laterality Date    COLONOSCOPY W/ POLYPECTOMY  01/19/2021       Review of patient's allergies indicates:  No Known Allergies    Current Outpatient Medications   Medication Instructions    acetaminophen (TYLENOL) 650 mg, Oral, Every 8 hours PRN    amLODIPine (NORVASC) 10 mg, Oral, Daily    blood sugar diagnostic Strp 1 each, Misc.(Non-Drug; Combo Route), 2 times daily    blood-glucose meter (FREESTYLE SYSTEM KIT) kit Use as instructed    empagliflozin (JARDIANCE) 10 mg, Oral, Daily    hydroCHLOROthiazide (HYDRODIURIL) 25 mg, Oral, Daily    lancets (ONETOUCH ULTRASOFT LANCETS) Misc 1 Box , Misc.(Non-Drug; Combo Route), 2 times daily    losartan (COZAAR) 25 mg, Oral, Daily       Social History[1]     Family History   Problem Relation Name Age of Onset    Hypertension Mother      Hypertension Father      Aneurysm Father          Patient Care Team:  Wanda Garcia FNP as PCP - General (Family Medicine)  Linda Richard LPN as Care Coordinator     Subjective:     Review of Systems     See HPI for details    Constitutional: Denies Change in appetite. Denies Chills. Denies Fever. Denies Night sweats.  Eye: Denies Blurred vision. Denies Discharge. Denies Eye pain.  ENT: Denies Decreased hearing. Denies Sore throat. Denies Swollen glands.  Respiratory: Denies Cough. Denies Shortness of breath. Denies Shortness of breath with exertion. Denies Wheezing.  Cardiovascular:  "DeniesChest pain at rest. Denies Chest pain with exertion. Denies Irregular heartbeat. Denies Palpitations. Denies Edema.  Gastrointestinal: Denies Abdominal pain. DeniesDiarrhea. Denies Nausea. Denies Vomiting. Denies Hematemesis or Hematochezia.  Genitourinary: Denies Dysuria. Denies Urinary frequency. Denies Urinary urgency. Denies Blood in urine.  Endocrine: Denies Cold intolerance. Denies Excessive thirst. Denies Heat intolerance. Denies Weight loss. Denies Weight gain.  Musculoskeletal: Denies Painful joints. Denies Weakness.  Integumentary: Denies Rash. Denies Itching. Denies Dry skin.  Neurologic: Denies Dizziness. Denies Fainting. Denies Headache.  Psychiatric: Denies Depression. Denies Anxiety. Denies Suicidal/Homicidal ideations.    All Other ROS: Negative except as stated in HPI.       Objective:     Visit Vitals  /71   Pulse 60   Temp 97.8 °F (36.6 °C) (Oral)   Resp 20   Ht 6' 1" (1.854 m)   Wt 103.9 kg (229 lb)   BMI 30.21 kg/m²       Physical Exam    General: Alert and oriented, No acute distress.  Head: Normocephalic, Atraumatic.  Eye: Pupils are equal, round and reactive to light, Extraocular movements are intact, Sclera non-icteric.  Ears/Nose/Throat: Normal, Mucosa moist,Clear.  Neck/Thyroid: Supple, Non-tender, No carotid bruit, No lymphadenopathy, No JVD, Full range of motion.  Respiratory: Clear to auscultation bilaterally; No wheezes, rales or rhonchi,Non-labored respirations, Symmetrical chest wall expansion.  Cardiovascular: Regular rate and rhythm, S1/S2 normal, No murmurs, rubs or gallops.  Gastrointestinal: Soft, Non-tender, Non-distended, Normal bowel sounds, No palpable organomegaly.  Musculoskeletal: Normal range of motion.  Integumentary: Warm, Dry, Intact, No suspicious lesions or rashes.  Extremities: No clubbing, cyanosis or edema  Neurologic: No focal deficits, Cranial Nerves II-XII are grossly intact, Motor strength normal upper and lower extremities, Sensory exam " intact.  Psychiatric: Normal interaction, Coherent speech, Euthymic mood, Appropriate affect       Labs Reviewed:     Chemistry:  Lab Results   Component Value Date     07/23/2025    K 3.6 07/23/2025    BUN 18.5 07/23/2025    CREATININE 1.19 07/23/2025    EGFRNORACEVR >60 07/23/2025    CALCIUM 10.5 (H) 07/23/2025    ALKPHOS 88 07/23/2025    ALBUMIN 4.4 07/23/2025    BILIDIR 0.1 08/19/2021    IBILI 0.30 08/19/2021    AST 15 07/23/2025    ALT 19 07/23/2025    [id number] 32.6 08/09/2023        Lab Results   Component Value Date    HGBA1C 7.5 (H) 07/23/2025        Hematology:  Lab Results   Component Value Date    WBC 8.22 07/23/2025    HGB 16.9 07/23/2025    HCT 49.3 07/23/2025     07/23/2025       Lipid Panel:  Lab Results   Component Value Date    CHOL 227 (H) 07/23/2025    HDL 49 07/23/2025    .00 (H) 07/23/2025    TRIG 166 (H) 07/23/2025    TOTALCHOLEST 5 07/23/2025        Urine:  Lab Results   Component Value Date    APPEARANCEUA Clear 07/25/2025    SGUA 1.021 07/25/2025    PROTEINUA Trace (A) 07/25/2025    KETONESUA Negative 07/25/2025    LEUKOCYTESUR Negative 07/25/2025    RBCUA 21-50 (A) 07/25/2025    WBCUA 0-5 07/25/2025    BACTERIA None Seen 07/25/2025    SQEPUA Trace (A) 07/25/2025    HYALINECASTS None Seen 07/25/2025    CREATRANDUR 60.4 (L) 07/25/2025        Assessment:       ICD-10-CM ICD-9-CM   1. Primary hypertension  I10 401.9   2. Class 2 severe obesity due to excess calories with serious comorbidity and body mass index (BMI) of 36.0 to 36.9 in adult  E66.812 278.01    E66.01 V85.36    Z68.36    3. Hyperglycemia  R73.9 790.29   4. Hypercalcemia  E83.52 275.42   5. Hematuria, unspecified type  R31.9 599.70   6. Type 2 diabetes mellitus with hyperglycemia, without long-term current use of insulin  E11.65 250.00     790.29   7. Screening for colon cancer  Z12.11 V76.51   8. Constipation, unspecified constipation type  K59.00 564.00        Plan:     1. Primary hypertension  (Primary)  Bp controlled. Low fat low salt diet and exercise. Cont HCTZ, Losartan, Amlodipine.     2. Class 2 severe obesity due to excess calories with serious comorbidity and body mass index (BMI) of 36.0 to 36.9 in adult  BMI 30. Low fat diet and exercise. Education provided.     3. Hyperglycemia  A1c 7.5 up from 6.0. ADA diet and exercise. Will increase jardiance to 25 mg 1 tab po daily.     4. Hypercalcemia  Calcium level 10.5. PTH in 4 months.     5. Hematuria, unspecified type  Pt asymptomatic. Will repeat UA C&S cyto in 4 months. ER precautions given.     6. Type 2 diabetes mellitus with hyperglycemia, without long-term current use of insulin  A1c 7.5 up from 6.0. ADA diet and exercise. Will increase jardiance to 25 mg 1 tab po daily. Urine microalbumin 23- will get with next labs. Dm foot exam done 3-14-24. DM eye exam today.      7. Constipation  Pt states Miralax is not helping with constipation. D/C Miralax. RX Linzess 145 mg 1 tab po daily. Encouraged high fiber diet, exercise, and drink plenty of water.     Follow up in about 4 months (around 2025) for with labs 1 week prior to appt. . In addition to their scheduled follow up, the patient has also been instructed to follow up on as needed basis.     Future Appointments   Date Time Provider Department Center   2025  7:00 PM SLEEP LAB  1, [facility] [place]             [1]   Social History  Socioeconomic History    Marital status: Single   Occupational History    Occupation: unemployed   Tobacco Use    Smoking status: Former     Current packs/day: 0.00     Average packs/day: 0.5 packs/day for 17.7 years (8.8 ttl pk-yrs)     Types: Cigarettes     Start date: 1981     Quit date: 1999     Years since quittin.5    Smokeless tobacco: Former   Substance and Sexual Activity    Alcohol use: Never    Drug use: Yes     Frequency: 7.0 times per week     Types: Marijuana    Sexual activity: Yes      Partners: Female     Social Drivers of Health     Financial Resource Strain: Low Risk  (3/25/2025)    Overall Financial Resource Strain (CARDIA)     Difficulty of Paying Living Expenses: Not hard at all   Food Insecurity: No Food Insecurity (3/25/2025)    Hunger Vital Sign     Worried About Running Out of Food in the Last Year: Never true     Ran Out of Food in the Last Year: Never true   Transportation Needs: No Transportation Needs (3/25/2025)    PRAPARE - Transportation     Lack of Transportation (Medical): No     Lack of Transportation (Non-Medical): No   Physical Activity: Inactive (3/25/2025)    Exercise Vital Sign     Days of Exercise per Week: 0 days     Minutes of Exercise per Session: 0 min   Stress: No Stress Concern Present (3/25/2025)    Citizen of [place] [employer] - Occupational Stress Questionnaire     Feeling of Stress : Only a little   Housing Stability: Low Risk  (3/25/2025)    Housing Stability Vital Sign     Unable to Pay for Housing in the Last Year: No     Homeless in the Last Year: No

## 2025-07-25 NOTE — PROGRESS NOTES
Wilfrido Henriquez is a 60 y.o. male here for a diabetic eye screening with non-dilated fundus photos per KATRINA Garcia [facility].    Patient cooperative?: Yes  Small pupils?: Yes  Last eye exam: Unknown    For exam results, see Encounter Report.